# Patient Record
Sex: MALE | Race: WHITE | NOT HISPANIC OR LATINO | URBAN - METROPOLITAN AREA
[De-identification: names, ages, dates, MRNs, and addresses within clinical notes are randomized per-mention and may not be internally consistent; named-entity substitution may affect disease eponyms.]

---

## 2022-10-24 ENCOUNTER — INPATIENT (INPATIENT)
Facility: HOSPITAL | Age: 47
LOS: 2 days | Discharge: HOME | DRG: 189 | End: 2022-10-27
Attending: INTERNAL MEDICINE | Admitting: INTERNAL MEDICINE
Payer: COMMERCIAL

## 2022-10-24 VITALS
HEART RATE: 87 BPM | WEIGHT: 259.93 LBS | HEIGHT: 69 IN | SYSTOLIC BLOOD PRESSURE: 108 MMHG | DIASTOLIC BLOOD PRESSURE: 76 MMHG | OXYGEN SATURATION: 98 % | RESPIRATION RATE: 16 BRPM | TEMPERATURE: 98 F

## 2022-10-24 DIAGNOSIS — R09.02 HYPOXEMIA: ICD-10-CM

## 2022-10-24 DIAGNOSIS — E78.5 HYPERLIPIDEMIA, UNSPECIFIED: ICD-10-CM

## 2022-10-24 DIAGNOSIS — E11.9 TYPE 2 DIABETES MELLITUS WITHOUT COMPLICATIONS: ICD-10-CM

## 2022-10-24 DIAGNOSIS — J96.01 ACUTE RESPIRATORY FAILURE WITH HYPOXIA: ICD-10-CM

## 2022-10-24 DIAGNOSIS — I10 ESSENTIAL (PRIMARY) HYPERTENSION: ICD-10-CM

## 2022-10-24 DIAGNOSIS — R55 SYNCOPE AND COLLAPSE: ICD-10-CM

## 2022-10-24 LAB
ALBUMIN SERPL ELPH-MCNC: 4.4 G/DL — SIGNIFICANT CHANGE UP (ref 3.3–5)
ALP SERPL-CCNC: 81 U/L — SIGNIFICANT CHANGE UP (ref 40–120)
ALT FLD-CCNC: 30 U/L — SIGNIFICANT CHANGE UP (ref 10–45)
ANION GAP SERPL CALC-SCNC: 16 MMOL/L — SIGNIFICANT CHANGE UP (ref 5–17)
APTT BLD: 28.5 SEC — SIGNIFICANT CHANGE UP (ref 27.5–35.5)
AST SERPL-CCNC: 27 U/L — SIGNIFICANT CHANGE UP (ref 10–40)
BASOPHILS # BLD AUTO: 0.02 K/UL — SIGNIFICANT CHANGE UP (ref 0–0.2)
BASOPHILS NFR BLD AUTO: 0.2 % — SIGNIFICANT CHANGE UP (ref 0–2)
BILIRUB SERPL-MCNC: 0.4 MG/DL — SIGNIFICANT CHANGE UP (ref 0.2–1.2)
BUN SERPL-MCNC: 24 MG/DL — HIGH (ref 7–23)
CALCIUM SERPL-MCNC: 10.1 MG/DL — SIGNIFICANT CHANGE UP (ref 8.4–10.5)
CHLORIDE SERPL-SCNC: 101 MMOL/L — SIGNIFICANT CHANGE UP (ref 96–108)
CO2 SERPL-SCNC: 21 MMOL/L — LOW (ref 22–31)
CREAT SERPL-MCNC: 0.79 MG/DL — SIGNIFICANT CHANGE UP (ref 0.5–1.3)
EGFR: 111 ML/MIN/1.73M2 — SIGNIFICANT CHANGE UP
EOSINOPHIL # BLD AUTO: 0.05 K/UL — SIGNIFICANT CHANGE UP (ref 0–0.5)
EOSINOPHIL NFR BLD AUTO: 0.5 % — SIGNIFICANT CHANGE UP (ref 0–6)
FLUAV AG NPH QL: SIGNIFICANT CHANGE UP
FLUBV AG NPH QL: SIGNIFICANT CHANGE UP
GLUCOSE SERPL-MCNC: 101 MG/DL — HIGH (ref 70–99)
HCT VFR BLD CALC: 46.6 % — SIGNIFICANT CHANGE UP (ref 39–50)
HGB BLD-MCNC: 15.3 G/DL — SIGNIFICANT CHANGE UP (ref 13–17)
IMM GRANULOCYTES NFR BLD AUTO: 0.4 % — SIGNIFICANT CHANGE UP (ref 0–0.9)
INR BLD: 1.03 RATIO — SIGNIFICANT CHANGE UP (ref 0.88–1.16)
LYMPHOCYTES # BLD AUTO: 1.48 K/UL — SIGNIFICANT CHANGE UP (ref 1–3.3)
LYMPHOCYTES # BLD AUTO: 16 % — SIGNIFICANT CHANGE UP (ref 13–44)
MAGNESIUM SERPL-MCNC: 1.4 MG/DL — LOW (ref 1.6–2.6)
MCHC RBC-ENTMCNC: 27.8 PG — SIGNIFICANT CHANGE UP (ref 27–34)
MCHC RBC-ENTMCNC: 32.8 GM/DL — SIGNIFICANT CHANGE UP (ref 32–36)
MCV RBC AUTO: 84.6 FL — SIGNIFICANT CHANGE UP (ref 80–100)
MONOCYTES # BLD AUTO: 0.72 K/UL — SIGNIFICANT CHANGE UP (ref 0–0.9)
MONOCYTES NFR BLD AUTO: 7.8 % — SIGNIFICANT CHANGE UP (ref 2–14)
NEUTROPHILS # BLD AUTO: 6.95 K/UL — SIGNIFICANT CHANGE UP (ref 1.8–7.4)
NEUTROPHILS NFR BLD AUTO: 75.1 % — SIGNIFICANT CHANGE UP (ref 43–77)
NRBC # BLD: 0 /100 WBCS — SIGNIFICANT CHANGE UP (ref 0–0)
NT-PROBNP SERPL-SCNC: 45 PG/ML — SIGNIFICANT CHANGE UP (ref 0–300)
PLATELET # BLD AUTO: 275 K/UL — SIGNIFICANT CHANGE UP (ref 150–400)
POTASSIUM SERPL-MCNC: 3.6 MMOL/L — SIGNIFICANT CHANGE UP (ref 3.5–5.3)
POTASSIUM SERPL-SCNC: 3.6 MMOL/L — SIGNIFICANT CHANGE UP (ref 3.5–5.3)
PROT SERPL-MCNC: 8 G/DL — SIGNIFICANT CHANGE UP (ref 6–8.3)
PROTHROM AB SERPL-ACNC: 11.9 SEC — SIGNIFICANT CHANGE UP (ref 10.5–13.4)
RBC # BLD: 5.51 M/UL — SIGNIFICANT CHANGE UP (ref 4.2–5.8)
RBC # FLD: 13.5 % — SIGNIFICANT CHANGE UP (ref 10.3–14.5)
RSV RNA NPH QL NAA+NON-PROBE: SIGNIFICANT CHANGE UP
SARS-COV-2 RNA SPEC QL NAA+PROBE: DETECTED
SODIUM SERPL-SCNC: 138 MMOL/L — SIGNIFICANT CHANGE UP (ref 135–145)
TROPONIN T, HIGH SENSITIVITY RESULT: 19 NG/L — SIGNIFICANT CHANGE UP (ref 0–51)
TROPONIN T, HIGH SENSITIVITY RESULT: 22 NG/L — SIGNIFICANT CHANGE UP (ref 0–51)
WBC # BLD: 9.26 K/UL — SIGNIFICANT CHANGE UP (ref 3.8–10.5)
WBC # FLD AUTO: 9.26 K/UL — SIGNIFICANT CHANGE UP (ref 3.8–10.5)

## 2022-10-24 PROCEDURE — 71045 X-RAY EXAM CHEST 1 VIEW: CPT | Mod: 26

## 2022-10-24 PROCEDURE — 93308 TTE F-UP OR LMTD: CPT | Mod: 26

## 2022-10-24 PROCEDURE — 99254 IP/OBS CNSLTJ NEW/EST MOD 60: CPT | Mod: 25

## 2022-10-24 PROCEDURE — 99285 EMERGENCY DEPT VISIT HI MDM: CPT

## 2022-10-24 PROCEDURE — 93010 ELECTROCARDIOGRAM REPORT: CPT

## 2022-10-24 PROCEDURE — 99221 1ST HOSP IP/OBS SF/LOW 40: CPT

## 2022-10-24 RX ORDER — DEXTROSE 50 % IN WATER 50 %
15 SYRINGE (ML) INTRAVENOUS ONCE
Refills: 0 | Status: DISCONTINUED | OUTPATIENT
Start: 2022-10-24 | End: 2022-10-27

## 2022-10-24 RX ORDER — ACETAMINOPHEN 500 MG
650 TABLET ORAL EVERY 6 HOURS
Refills: 0 | Status: DISCONTINUED | OUTPATIENT
Start: 2022-10-24 | End: 2022-10-27

## 2022-10-24 RX ORDER — ONDANSETRON 8 MG/1
4 TABLET, FILM COATED ORAL EVERY 8 HOURS
Refills: 0 | Status: DISCONTINUED | OUTPATIENT
Start: 2022-10-24 | End: 2022-10-27

## 2022-10-24 RX ORDER — INSULIN LISPRO 100/ML
VIAL (ML) SUBCUTANEOUS
Refills: 0 | Status: DISCONTINUED | OUTPATIENT
Start: 2022-10-24 | End: 2022-10-27

## 2022-10-24 RX ORDER — ATORVASTATIN CALCIUM 80 MG/1
1 TABLET, FILM COATED ORAL
Qty: 0 | Refills: 0 | DISCHARGE

## 2022-10-24 RX ORDER — ATORVASTATIN CALCIUM 80 MG/1
40 TABLET, FILM COATED ORAL AT BEDTIME
Refills: 0 | Status: DISCONTINUED | OUTPATIENT
Start: 2022-10-24 | End: 2022-10-27

## 2022-10-24 RX ORDER — METFORMIN HYDROCHLORIDE 850 MG/1
1 TABLET ORAL
Qty: 0 | Refills: 0 | DISCHARGE

## 2022-10-24 RX ORDER — DEXTROSE 50 % IN WATER 50 %
25 SYRINGE (ML) INTRAVENOUS ONCE
Refills: 0 | Status: DISCONTINUED | OUTPATIENT
Start: 2022-10-24 | End: 2022-10-27

## 2022-10-24 RX ORDER — ALBUTEROL 90 UG/1
2 AEROSOL, METERED ORAL EVERY 6 HOURS
Refills: 0 | Status: DISCONTINUED | OUTPATIENT
Start: 2022-10-24 | End: 2022-10-27

## 2022-10-24 RX ORDER — ASPIRIN/CALCIUM CARB/MAGNESIUM 324 MG
324 TABLET ORAL ONCE
Refills: 0 | Status: COMPLETED | OUTPATIENT
Start: 2022-10-24 | End: 2022-10-24

## 2022-10-24 RX ORDER — ENOXAPARIN SODIUM 100 MG/ML
120 INJECTION SUBCUTANEOUS EVERY 12 HOURS
Refills: 0 | Status: DISCONTINUED | OUTPATIENT
Start: 2022-10-24 | End: 2022-10-24

## 2022-10-24 RX ORDER — GLUCAGON INJECTION, SOLUTION 0.5 MG/.1ML
1 INJECTION, SOLUTION SUBCUTANEOUS ONCE
Refills: 0 | Status: DISCONTINUED | OUTPATIENT
Start: 2022-10-24 | End: 2022-10-27

## 2022-10-24 RX ORDER — LANOLIN ALCOHOL/MO/W.PET/CERES
3 CREAM (GRAM) TOPICAL AT BEDTIME
Refills: 0 | Status: DISCONTINUED | OUTPATIENT
Start: 2022-10-24 | End: 2022-10-27

## 2022-10-24 RX ORDER — DEXAMETHASONE 0.5 MG/5ML
6 ELIXIR ORAL DAILY
Refills: 0 | Status: DISCONTINUED | OUTPATIENT
Start: 2022-10-24 | End: 2022-10-26

## 2022-10-24 RX ORDER — SODIUM CHLORIDE 9 MG/ML
1000 INJECTION, SOLUTION INTRAVENOUS
Refills: 0 | Status: DISCONTINUED | OUTPATIENT
Start: 2022-10-24 | End: 2022-10-27

## 2022-10-24 RX ORDER — MAGNESIUM SULFATE 500 MG/ML
1 VIAL (ML) INJECTION ONCE
Refills: 0 | Status: COMPLETED | OUTPATIENT
Start: 2022-10-24 | End: 2022-10-24

## 2022-10-24 RX ORDER — ESCITALOPRAM OXALATE 10 MG/1
5 TABLET, FILM COATED ORAL DAILY
Refills: 0 | Status: DISCONTINUED | OUTPATIENT
Start: 2022-10-24 | End: 2022-10-27

## 2022-10-24 RX ORDER — DEXTROSE 50 % IN WATER 50 %
12.5 SYRINGE (ML) INTRAVENOUS ONCE
Refills: 0 | Status: DISCONTINUED | OUTPATIENT
Start: 2022-10-24 | End: 2022-10-27

## 2022-10-24 RX ORDER — INSULIN GLARGINE 100 [IU]/ML
10 INJECTION, SOLUTION SUBCUTANEOUS
Qty: 0 | Refills: 0 | DISCHARGE

## 2022-10-24 RX ORDER — ESCITALOPRAM OXALATE 10 MG/1
1 TABLET, FILM COATED ORAL
Qty: 0 | Refills: 0 | DISCHARGE

## 2022-10-24 RX ORDER — INSULIN LISPRO 100/ML
VIAL (ML) SUBCUTANEOUS AT BEDTIME
Refills: 0 | Status: DISCONTINUED | OUTPATIENT
Start: 2022-10-24 | End: 2022-10-27

## 2022-10-24 RX ORDER — ENOXAPARIN SODIUM 100 MG/ML
40 INJECTION SUBCUTANEOUS EVERY 12 HOURS
Refills: 0 | Status: DISCONTINUED | OUTPATIENT
Start: 2022-10-24 | End: 2022-10-27

## 2022-10-24 RX ORDER — LOSARTAN POTASSIUM 100 MG/1
100 TABLET, FILM COATED ORAL DAILY
Refills: 0 | Status: DISCONTINUED | OUTPATIENT
Start: 2022-10-24 | End: 2022-10-27

## 2022-10-24 RX ORDER — HYDROCHLOROTHIAZIDE 25 MG
25 TABLET ORAL DAILY
Refills: 0 | Status: DISCONTINUED | OUTPATIENT
Start: 2022-10-24 | End: 2022-10-25

## 2022-10-24 RX ORDER — DAPAGLIFLOZIN 10 MG/1
1 TABLET, FILM COATED ORAL
Qty: 0 | Refills: 0 | DISCHARGE

## 2022-10-24 RX ADMIN — Medication 100 GRAM(S): at 16:25

## 2022-10-24 NOTE — ED ADULT NURSE NOTE - OBJECTIVE STATEMENT
46y/M BIBEMS from outpatient clinic for abnormal ekg and syncope x 2 days ago. pt endorsed starting new meds recently, hx of DM. A&Ox3, GCS 15. Denies chest pain, sob, dizziness, n/v. No distress

## 2022-10-24 NOTE — ED PROVIDER NOTE - PHYSICAL EXAMINATION
GENERAL: well appearing in no acute distress, non-toxic appearing  HEAD: normocephalic, atraumatic, scabbed over abrasion noted on philtrum  HEENT: normal conjunctiva,   CARDIAC: regular rate and rhythm, no appreciable murmurs, 2+ pulses in UE/LE b/l  PULM: normal breath sounds, clear to ascultation bilaterally, no rales, rhonchi, wheezing  GI: abdomen nondistended, soft, nontender, no guarding, rebound tenderness  NEURO: no focal motor or sensory deficits,  normal speech, PERRLA, EOMI, AAOx3  MSK: no peripheral edema, no calf tenderness b/l  SKIN: well-perfused, extremities warm, no visible rashes  PSYCH: appropriate mood and affect

## 2022-10-24 NOTE — ED PROVIDER NOTE - ATTENDING CONTRIBUTION TO CARE
attending Sachi: 46yM h/o HTN, DM, HLD sent in for abnormal EKG after being evaluated outpatient for multiple presyncopal episodes over the last few days and 1 syncopal episode 2 days ago. Reports dizziness upon walking. Hypoxic on arrival requiring nasal cannula, remainder of exam as above. Will obtain ekg, place on tele, labs including trop and d-dimer, cxr, RVP, reassess

## 2022-10-24 NOTE — H&P ADULT - PROBLEM SELECTOR PLAN 2
Covid +ve, requiring supplemental O2. CXR: poor inspiratory effort with possible hazy opacity in the RLL?, pending final results  -Supplemental O2 to maintain O2 level of 92% or higher   -F/u with inflammatory markers  -Started Dexamethasone 6mg for 10 days   -F/u Blcx   -VTE PPx with Lovenox 40mg BID given Ddimer <150   -ID consulted

## 2022-10-24 NOTE — H&P ADULT - PROBLEM SELECTOR PLAN 1
New onset of syncope with no clear etiology. Mg 1.4, POCUS ECHO negative for pleural effusion, cannot confirm on structural heart disease. ECG: T-wave inversion, new findings compared to previous ECG of 10/11/22. +covid infection. D-dimer negative. Trops negative x2    -Tele monitoring   -Maintain electrolytes, f/u with daily BMP   -Treat underlying cause of acute respiratory failure due to Covid   -Maintain BG of 140-180mg/dl during hospitalization   -Maintain MAP of 65   -Patient can benefit from ECHO TTE, will f/u with Cardiology recs  -Cardiology consulted

## 2022-10-24 NOTE — ED PROCEDURE NOTE - US CPT CODES
59775 US Chest (PTX, Pleural Effussion/CHF vs COPD)/15225 Echocardiography Transthoracic with Image 2D (Echo/FAST)

## 2022-10-24 NOTE — H&P ADULT - ASSESSMENT
47 y/o M with PMhx of HTN, DM2, HLD admitted for acute respiratory failure with hypoxia with syncopal work-up, likely due to COVID infection, rule out ACS given comorbidities.

## 2022-10-24 NOTE — H&P ADULT - HISTORY OF PRESENT ILLNESS
45 y/o M with PMhx of HTN, DM2, HLD presents with syncope, which started 3 days ago. Patient states he had one episode of syncope after walking on a flat incline for 30 mins, associated with dyspnea, which is unusual for him. Shortly afterwards patient was in his living room with dizziness and had syncope as well. Later that afternoon he was walking across his bedroom, felt dizzy, passed out, and woke up with an abrasion on his upper lip. On Sunday pt had another episode of feeling dizzy, his friend who is a ED doc checked orthostatics which were 110/70 supine and 100/70 standing. Denies chest pain, chest palpitations, headache, vision changes, abd pain, n/v/d/f, vertigo, vision changes, loss of sensation, weakness, urinary or bowel changes, focal neurological deficits, head trauma. Patient states the only change was his medication Farixga which was changed from 5mg to 10mg  3 weeks ago. States his blood glucose was around 100. Patient states he was diagnosed with Covid last week of September, with no hospitalizations. States he follows up with outpatient Cardiologist. Non-smoker. Works at [employer], lives with his mother. Patient followed up with his PCP this am, with new ECG changes for t-wave inversions compared to prior ECG on 10/11/22.

## 2022-10-24 NOTE — ED PROVIDER NOTE - CLINICAL SUMMARY MEDICAL DECISION MAKING FREE TEXT BOX
46 year old male hx of dm, htn, hld, presenting for evaluation of 1 episode of syncope and multiple episodes of pre-syncope, with one episode of POOLE after a 30 minute walk on flat terrain since Saturday, sent in by PMD for changes on his EKG, afebrile, saturating well on triage vitals, no evidence of fluid overload on exam.     Concern for ACS vs PE vs Arrythmia, will do cardiac workup, get ED US echo.   Pt PERCs out, however, given pt's multiple episodes of near-syncope and syncope will get a d-dimer, if elevated will follow with CTA. Pt likely requires admission to either hospital or CDU for further eval.

## 2022-10-24 NOTE — CONSULT NOTE ADULT - SUBJECTIVE AND OBJECTIVE BOX
CHIEF COMPLAINT:  syncope recent covid, covid swab postive for sarishacovid 19 abnormal ecg      HISTORY OF PRESENT ILLNESS:    Patient has hx of hypertension diabetes hypelipidemia  hx of elevated BMI with significant weight loss over last several months  normal ecg and normal nuclear stress test ?May 2022    Had covid 1 month ago sore throat fatigue  had episode of near syncope and episode of true syncope several days ago. Had been feeling sob with walking hills which is new  no palpitations or chest pain    ecg Dr. Gilbert T inversions inferorly  pocus echo in er normal wall motion    presently denies sob or cp but O2 sat at 90-92%        PAST MEDICAL & SURGICAL HISTORY:    HTN (hypertension)      HLD (hyperlipidemia)      DM (diabetes mellitus)      No significant past surgical history              MEDICATIONS:  enoxaparin Injectable 120 milliGRAM(s) SubCutaneous every 12 hours  hydrochlorothiazide 25 milliGRAM(s) Oral daily  losartan 100 milliGRAM(s) Oral daily      ALBUTerol    90 MICROgram(s) HFA Inhaler 2 Puff(s) Inhalation every 6 hours PRN    acetaminophen     Tablet .. 650 milliGRAM(s) Oral every 6 hours PRN  escitalopram 5 milliGRAM(s) Oral daily  melatonin 3 milliGRAM(s) Oral at bedtime PRN  ondansetron Injectable 4 milliGRAM(s) IV Push every 8 hours PRN    aluminum hydroxide/magnesium hydroxide/simethicone Suspension 30 milliLiter(s) Oral every 4 hours PRN    atorvastatin 40 milliGRAM(s) Oral at bedtime  dexAMETHasone     Tablet 6 milliGRAM(s) Oral daily  dextrose 50% Injectable 25 Gram(s) IV Push once  dextrose 50% Injectable 12.5 Gram(s) IV Push once  dextrose 50% Injectable 25 Gram(s) IV Push once  dextrose Oral Gel 15 Gram(s) Oral once PRN  glucagon  Injectable 1 milliGRAM(s) IntraMuscular once  insulin lispro (ADMELOG) corrective regimen sliding scale   SubCutaneous three times a day before meals    dextrose 5%. 1000 milliLiter(s) IV Continuous <Continuous>  dextrose 5%. 1000 milliLiter(s) IV Continuous <Continuous>      FAMILY HISTORY:      SOCIAL HISTORY:    [ ] Non-smoker  [ ] Alcohol none    Allergies    No Known Allergies    Intolerances    	    PHYSICAL EXAM:  T(C): 36.8 (10-24-22 @ 16:54), Max: 36.8 (10-24-22 @ 16:54)  HR: 96 (10-24-22 @ 16:54) (84 - 96)  BP: 117/74 (10-24-22 @ 16:54) (108/76 - 121/71)  RR: 26 (10-24-22 @ 16:54) (16 - 26)  SpO2: 96% (10-24-22 @ 16:54) (88% - 98%)  Wt(kg): --  I&O's Summary      Appearance: Normal	nad lying flat  HEENT:   Normal oral mucosa, PERRL, EOMI	  Cardiovascular: Normal S1 S2, No JVD, No murmurs no s3  Respiratory: Lungs clear to auscultation	  Psychiatry: A & O x 3, Mood & affect appropriate  Gastrointestinal:  Soft, Non-tender, + BS protuberant abdomen	  Skin: No rashes, No ecchymoses, No cyanosis	  Neurologic: Non-focal  Extremities: No clubbing, cyanosis or edema  Vascular: Peripheral pulses palpable 2+ bilaterally    TELEMETRY: 	    ECG:  NSR T inversions inferiorly	  RADIOLOGY:    LABS:	 	    CARDIAC MARKERS:                                  15.3   9.26  )-----------( 275      ( 24 Oct 2022 14:34 )             46.6     10-24    138  |  101  |  24<H>  ----------------------------<  101<H>  3.6   |  21<L>  |  0.79    Ca    10.1      24 Oct 2022 14:34  Mg     1.4     10-24    TPro  8.0  /  Alb  4.4  /  TBili  0.4  /  DBili  x   /  AST  27  /  ALT  30  /  AlkPhos  81  10-24    proBNP: Serum Pro-Brain Natriuretic Peptide: 45 pg/mL (10-24 @ 14:34)    Lipid Profile:   HgA1c:   TSH:

## 2022-10-24 NOTE — ED PROVIDER NOTE - SHIFT CHANGE DETAILS
Attending MD Navarrete: 46M w obese, HTN, multiple near syncopal episodes, seen by Dr. Tovar's partner, new TWI inferioly, here hypoxic to 80s, found to have +COVID, pending only admission, Dr. Ladd paged

## 2022-10-24 NOTE — CONSULT NOTE ADULT - ASSESSMENT
Patient presents with one episode of near syncope and one episode of true syncope perhaps related to dehyration now with some lower O2 saturation, abnormal ecg and psotive for covid with normal enzymes and bnp and pocus echo    1. abnormal ecg- doubt ischemia r/o MI formal echo  2. sob covid postive perhaps related to covid  3. hypertension controlled  4. diabetes on  multiple medications at home including long active injectables    Recommend  2 D Echo in echo lab  send D-Dimer  CT chest with contrast?    will follow for further cardia advice

## 2022-10-24 NOTE — H&P ADULT - PROBLEM SELECTOR PLAN 3
-F/u with Hba1c, lipid panel   -Hold home med: Semglee, Metformin and Farixga  -LDISS and fingersticks QHS and AC   -Diet: diabetic

## 2022-10-24 NOTE — ED PROVIDER NOTE - PROGRESS NOTE DETAILS
Sita Johnson, PGY1, MD: during physical exam pt had an episode of flushing and hypoxia to the 90%, episode resolved after no intervention, pt denies sob or cp. 20 minutes later pt had a second decline in o2 saturation to the high 80, pt now placed on 2L NC saturating 95%, still denies cp or sob Sita Johnson, PGY1, MD: pt showed us outpatient EKG taken on oct 11, TWI in inferior leads on EKG are new compared to the prior ekg Attending MD Navarrete: Spoke with Giuliana at Dr. Tovar Attending MD Navarrete: Spoke with Giuliana at Dr. Tovar, reports speaking with Dr. Ladd in regards to patient.  Can admit to his service.  Received call from Dr. Ladd, discussed case, will admit to his service.  Patient updated. Sita Johnson, PGY1, MD: pt states he was positive for covid 3 weeks ago, tested negative for [place] at home

## 2022-10-24 NOTE — H&P ADULT - NEGATIVE NEUROLOGICAL SYMPTOMS
no transient paralysis/no weakness/no paresthesias/no generalized seizures/no vertigo/no loss of sensation/no difficulty walking/no headache/no loss of consciousness/no confusion

## 2022-10-24 NOTE — PATIENT PROFILE ADULT - FALL HARM RISK - HARM RISK INTERVENTIONS
Assistance with ambulation/Assistance OOB with selected safe patient handling equipment/Communicate Risk of Fall with Harm to all staff/Monitor gait and stability/Reinforce activity limits and safety measures with patient and family/Sit up slowly, dangle for a short time, stand at bedside before walking/Tailored Fall Risk Interventions/Visual Cue: Yellow wristband and red socks/Bed in lowest position, wheels locked, appropriate side rails in place/Call bell, personal items and telephone in reach/Instruct patient to call for assistance before getting out of bed or chair/Non-slip footwear when patient is out of bed/[place] to call system/Physically safe environment - no spills, clutter or unnecessary equipment/Purposeful Proactive Rounding/Room/bathroom lighting operational, light cord in reach

## 2022-10-24 NOTE — ED PROVIDER NOTE - OBJECTIVE STATEMENT
46 year old male hx of htn, dm, hld, sent in by PMD for evaluation of EKG changes noticed in office this am after pt went in for evaluation of multiple presyncopal episodes and 1 syncopal episode since Saturday. Pt reports that Saturday morning he went for his usual 30 minute walk along a flat incline and at the end of the walk noticed some SOB which has never happened before. Afterwards the pt was walking across his living room when he became very dizzy and collapsed into a chair. Later that afternoon he was walking across his bedroom, felt dizzy, passed out, and woke up with an abrasion on his upper lip. On sunday pt had another episode of feeling dizzy, his friend who is a ED doc checked orthostatics which were 110/70 supine and 100/70 standing. Pt denies any cp, swelling in the le, pain with breathing, SOB, sweating, fevers, n/v.. Pt was evaluated by his pcp this am who noted ekg changes and sent him in for eval.  Pt was lightheaded this am.

## 2022-10-25 DIAGNOSIS — U07.1 COVID-19: ICD-10-CM

## 2022-10-25 DIAGNOSIS — R09.02 HYPOXEMIA: ICD-10-CM

## 2022-10-25 LAB
A1C WITH ESTIMATED AVERAGE GLUCOSE RESULT: 6.2 % — HIGH (ref 4–5.6)
ANION GAP SERPL CALC-SCNC: 16 MMOL/L — SIGNIFICANT CHANGE UP (ref 5–17)
BASOPHILS # BLD AUTO: 0.02 K/UL — SIGNIFICANT CHANGE UP (ref 0–0.2)
BASOPHILS NFR BLD AUTO: 0.3 % — SIGNIFICANT CHANGE UP (ref 0–2)
BUN SERPL-MCNC: 18 MG/DL — SIGNIFICANT CHANGE UP (ref 7–23)
CALCIUM SERPL-MCNC: 10.1 MG/DL — SIGNIFICANT CHANGE UP (ref 8.4–10.5)
CHLORIDE SERPL-SCNC: 100 MMOL/L — SIGNIFICANT CHANGE UP (ref 96–108)
CHOLEST SERPL-MCNC: 101 MG/DL — SIGNIFICANT CHANGE UP
CO2 SERPL-SCNC: 24 MMOL/L — SIGNIFICANT CHANGE UP (ref 22–31)
CREAT SERPL-MCNC: 0.84 MG/DL — SIGNIFICANT CHANGE UP (ref 0.5–1.3)
CRP SERPL-MCNC: 22 MG/L — HIGH (ref 0–4)
EGFR: 109 ML/MIN/1.73M2 — SIGNIFICANT CHANGE UP
EOSINOPHIL # BLD AUTO: 0.09 K/UL — SIGNIFICANT CHANGE UP (ref 0–0.5)
EOSINOPHIL NFR BLD AUTO: 1.2 % — SIGNIFICANT CHANGE UP (ref 0–6)
ERYTHROCYTE [SEDIMENTATION RATE] IN BLOOD: 66 MM/HR — HIGH (ref 0–15)
ESTIMATED AVERAGE GLUCOSE: 131 MG/DL — HIGH (ref 68–114)
GLUCOSE BLDC GLUCOMTR-MCNC: 106 MG/DL — HIGH (ref 70–99)
GLUCOSE BLDC GLUCOMTR-MCNC: 107 MG/DL — HIGH (ref 70–99)
GLUCOSE BLDC GLUCOMTR-MCNC: 148 MG/DL — HIGH (ref 70–99)
GLUCOSE BLDC GLUCOMTR-MCNC: 160 MG/DL — HIGH (ref 70–99)
GLUCOSE BLDC GLUCOMTR-MCNC: 175 MG/DL — HIGH (ref 70–99)
GLUCOSE BLDC GLUCOMTR-MCNC: 94 MG/DL — SIGNIFICANT CHANGE UP (ref 70–99)
GLUCOSE SERPL-MCNC: 100 MG/DL — HIGH (ref 70–99)
HCT VFR BLD CALC: 48.7 % — SIGNIFICANT CHANGE UP (ref 39–50)
HDLC SERPL-MCNC: 45 MG/DL — SIGNIFICANT CHANGE UP
HGB BLD-MCNC: 15.7 G/DL — SIGNIFICANT CHANGE UP (ref 13–17)
IMM GRANULOCYTES NFR BLD AUTO: 0.4 % — SIGNIFICANT CHANGE UP (ref 0–0.9)
LDH SERPL L TO P-CCNC: 157 U/L — SIGNIFICANT CHANGE UP (ref 50–242)
LIPID PNL WITH DIRECT LDL SERPL: 42 MG/DL — SIGNIFICANT CHANGE UP
LYMPHOCYTES # BLD AUTO: 1.65 K/UL — SIGNIFICANT CHANGE UP (ref 1–3.3)
LYMPHOCYTES # BLD AUTO: 21.1 % — SIGNIFICANT CHANGE UP (ref 13–44)
MAGNESIUM SERPL-MCNC: 1.5 MG/DL — LOW (ref 1.6–2.6)
MCHC RBC-ENTMCNC: 27.5 PG — SIGNIFICANT CHANGE UP (ref 27–34)
MCHC RBC-ENTMCNC: 32.2 GM/DL — SIGNIFICANT CHANGE UP (ref 32–36)
MCV RBC AUTO: 85.3 FL — SIGNIFICANT CHANGE UP (ref 80–100)
MONOCYTES # BLD AUTO: 0.61 K/UL — SIGNIFICANT CHANGE UP (ref 0–0.9)
MONOCYTES NFR BLD AUTO: 7.8 % — SIGNIFICANT CHANGE UP (ref 2–14)
NEUTROPHILS # BLD AUTO: 5.41 K/UL — SIGNIFICANT CHANGE UP (ref 1.8–7.4)
NEUTROPHILS NFR BLD AUTO: 69.2 % — SIGNIFICANT CHANGE UP (ref 43–77)
NON HDL CHOLESTEROL: 56 MG/DL — SIGNIFICANT CHANGE UP
NRBC # BLD: 0 /100 WBCS — SIGNIFICANT CHANGE UP (ref 0–0)
PHOSPHATE SERPL-MCNC: 4.5 MG/DL — SIGNIFICANT CHANGE UP (ref 2.5–4.5)
PLATELET # BLD AUTO: 270 K/UL — SIGNIFICANT CHANGE UP (ref 150–400)
POTASSIUM SERPL-MCNC: 3.6 MMOL/L — SIGNIFICANT CHANGE UP (ref 3.5–5.3)
POTASSIUM SERPL-SCNC: 3.6 MMOL/L — SIGNIFICANT CHANGE UP (ref 3.5–5.3)
PROCALCITONIN SERPL-MCNC: 0.04 NG/ML — SIGNIFICANT CHANGE UP (ref 0.02–0.1)
RBC # BLD: 5.71 M/UL — SIGNIFICANT CHANGE UP (ref 4.2–5.8)
RBC # FLD: 13.3 % — SIGNIFICANT CHANGE UP (ref 10.3–14.5)
SODIUM SERPL-SCNC: 140 MMOL/L — SIGNIFICANT CHANGE UP (ref 135–145)
T4 FREE+ TSH PNL SERPL: 1.97 UIU/ML — SIGNIFICANT CHANGE UP (ref 0.27–4.2)
TRIGL SERPL-MCNC: 69 MG/DL — SIGNIFICANT CHANGE UP
WBC # BLD: 7.81 K/UL — SIGNIFICANT CHANGE UP (ref 3.8–10.5)
WBC # FLD AUTO: 7.81 K/UL — SIGNIFICANT CHANGE UP (ref 3.8–10.5)

## 2022-10-25 PROCEDURE — 71275 CT ANGIOGRAPHY CHEST: CPT | Mod: 26

## 2022-10-25 PROCEDURE — 99254 IP/OBS CNSLTJ NEW/EST MOD 60: CPT

## 2022-10-25 RX ORDER — CHLORHEXIDINE GLUCONATE 213 G/1000ML
1 SOLUTION TOPICAL
Refills: 0 | Status: DISCONTINUED | OUTPATIENT
Start: 2022-10-25 | End: 2022-10-27

## 2022-10-25 RX ORDER — MAGNESIUM SULFATE 500 MG/ML
2 VIAL (ML) INJECTION ONCE
Refills: 0 | Status: COMPLETED | OUTPATIENT
Start: 2022-10-25 | End: 2022-10-25

## 2022-10-25 RX ADMIN — ENOXAPARIN SODIUM 40 MILLIGRAM(S): 100 INJECTION SUBCUTANEOUS at 17:07

## 2022-10-25 RX ADMIN — ATORVASTATIN CALCIUM 40 MILLIGRAM(S): 80 TABLET, FILM COATED ORAL at 22:01

## 2022-10-25 RX ADMIN — LOSARTAN POTASSIUM 100 MILLIGRAM(S): 100 TABLET, FILM COATED ORAL at 06:47

## 2022-10-25 RX ADMIN — ENOXAPARIN SODIUM 40 MILLIGRAM(S): 100 INJECTION SUBCUTANEOUS at 06:46

## 2022-10-25 RX ADMIN — Medication 1: at 17:07

## 2022-10-25 RX ADMIN — Medication 25 MILLIGRAM(S): at 06:47

## 2022-10-25 RX ADMIN — Medication 3 MILLIGRAM(S): at 22:05

## 2022-10-25 RX ADMIN — Medication 25 GRAM(S): at 08:42

## 2022-10-25 RX ADMIN — CHLORHEXIDINE GLUCONATE 1 APPLICATION(S): 213 SOLUTION TOPICAL at 12:15

## 2022-10-25 RX ADMIN — ESCITALOPRAM OXALATE 5 MILLIGRAM(S): 10 TABLET, FILM COATED ORAL at 12:15

## 2022-10-25 RX ADMIN — Medication 6 MILLIGRAM(S): at 08:36

## 2022-10-25 NOTE — PHYSICAL THERAPY INITIAL EVALUATION ADULT - PERTINENT HX OF CURRENT PROBLEM, REHAB EVAL
47 y/o M with PMhx of HTN, DM2, HLD admitted for acute respiratory failure with hypoxia with syncopal work-up, likely due to COVID infection, rule out ACS given comorbidities. Hosp course: POCUS TTE 10/24 No Pericardial Effusion.

## 2022-10-25 NOTE — PHYSICAL THERAPY INITIAL EVALUATION ADULT - ADDITIONAL COMMENTS
Pt lives in an apartment alone with elevator access. Pt performed ADL/IADLs independently. Ambulates with no assistive device

## 2022-10-25 NOTE — CONSULT NOTE ADULT - PROBLEM SELECTOR RECOMMENDATION 9
-Mild hypoxia in ED  -CXR grossly clear  -Although ddimer normal, CTA chest ordered to r/o PE given syncope and to further evaluate lung parenchyma  -Keep sats >90% with O2 PRN  -No SOB at rest, +POOLE -Mild hypoxia in ED  -CXR grossly clear  -Although ddimer normal, CTA chest ordered to r/o PE given syncope and to further evaluate lung parenchyma  -Keep sats >90% with O2 PRN  -No SOB at rest, +POOLE  -VBG in AM

## 2022-10-25 NOTE — CONSULT NOTE ADULT - SUBJECTIVE AND OBJECTIVE BOX
PULMONARY CONSULT    HPI: 47 y/o M with PMH of HTN, DM2, HLD. Presents with syncope, which started 3 days prior to admission. Patient states he had one episode of syncope after walking on a flat incline for 30 mins, associated with dyspnea, which is unusual for him. Shortly afterwards patient was in his living room with dizziness and had syncope as well. Later that afternoon he was walking across his bedroom, felt dizzy, passed out, and woke up with an abrasion on his upper lip. On Sunday pt had another episode of feeling dizzy, his friend who is a ED doc checked orthostatics which were 110/70 supine and 100/70 standing. He was diagnosed with COVID last week of September, with no hospitalizations. States he follows up with outpatient Cardiologist. Patient followed up with his PCP this am, with new ECG changes for t-wave inversions compared to prior ECG on 10/11/22. Found to have mild hypoxia in ED - 88% RA, placed on 2LNC. Denies SOB at rest, sputum production, CP, pleuritic CP.         PAST MEDICAL & SURGICAL HISTORY:  HTN (hypertension)  HLD (hyperlipidemia)  DM (diabetes mellitus)  No significant past surgical history      Allergies  No Known Allergies      FAMILY HISTORY:  Family history of early CAD (Father)      Social history: never a smoker     Review of Systems:  CONSTITUTIONAL: No fever, chills, or fatigue  EYES: No eye pain, visual disturbances, or discharge  ENMT:  No difficulty hearing, tinnitus, vertigo; No sinus or throat pain  NECK: No pain or stiffness  RESPIRATORY: Per above  CARDIOVASCULAR: No chest pain, palpitations, dizziness, or leg swelling  GASTROINTESTINAL: No abdominal or epigastric pain. No nausea, vomiting, or hematemesis; No diarrhea or constipation. No melena or hematochezia.  GENITOURINARY: No dysuria, frequency, hematuria, or incontinence  NEUROLOGICAL: Per above   SKIN: No itching, burning, rashes, or lesions   MUSCULOSKELETAL: No joint pain or swelling; No muscle, back, or extremity pain  PSYCHIATRIC: No depression, anxiety, mood swings, or difficulty sleeping      Medications:  MEDICATIONS  (STANDING):  atorvastatin 40 milliGRAM(s) Oral at bedtime  chlorhexidine 2% Cloths 1 Application(s) Topical <User Schedule>  dexAMETHasone     Tablet 6 milliGRAM(s) Oral daily  dextrose 5%. 1000 milliLiter(s) (50 mL/Hr) IV Continuous <Continuous>  dextrose 5%. 1000 milliLiter(s) (100 mL/Hr) IV Continuous <Continuous>  dextrose 50% Injectable 25 Gram(s) IV Push once  dextrose 50% Injectable 12.5 Gram(s) IV Push once  dextrose 50% Injectable 25 Gram(s) IV Push once  enoxaparin Injectable 40 milliGRAM(s) SubCutaneous every 12 hours  escitalopram 5 milliGRAM(s) Oral daily  glucagon  Injectable 1 milliGRAM(s) IntraMuscular once  hydrochlorothiazide 25 milliGRAM(s) Oral daily  insulin lispro (ADMELOG) corrective regimen sliding scale   SubCutaneous three times a day before meals  insulin lispro (ADMELOG) corrective regimen sliding scale   SubCutaneous at bedtime  losartan 100 milliGRAM(s) Oral daily    MEDICATIONS  (PRN):  acetaminophen     Tablet .. 650 milliGRAM(s) Oral every 6 hours PRN Temp greater or equal to 38C (100.4F), Mild Pain (1 - 3)  ALBUTerol    90 MICROgram(s) HFA Inhaler 2 Puff(s) Inhalation every 6 hours PRN Shortness of Breath and/or Wheezing  aluminum hydroxide/magnesium hydroxide/simethicone Suspension 30 milliLiter(s) Oral every 4 hours PRN Dyspepsia  dextrose Oral Gel 15 Gram(s) Oral once PRN Blood Glucose LESS THAN 70 milliGRAM(s)/deciliter  melatonin 3 milliGRAM(s) Oral at bedtime PRN Insomnia  ondansetron Injectable 4 milliGRAM(s) IV Push every 8 hours PRN Nausea and/or Vomiting            Vital Signs Last 24 Hrs  T(C): 36.7 (25 Oct 2022 11:48), Max: 37.1 (24 Oct 2022 19:24)  T(F): 98.1 (25 Oct 2022 11:48), Max: 98.8 (24 Oct 2022 19:24)  HR: 70 (25 Oct 2022 04:00) (70 - 96)  BP: 123/74 (25 Oct 2022 04:00) (108/76 - 123/74)  BP(mean): 88 (24 Oct 2022 18:21) (82 - 88)  RR: 18 (25 Oct 2022 11:48) (16 - 26)  SpO2: 97% (25 Oct 2022 11:48) (88% - 99%)    Parameters below as of 25 Oct 2022 11:48  Patient On (Oxygen Delivery Method): nasal cannula  O2 Flow (L/min): 2                    LABS:                        15.7   7.81  )-----------( 270      ( 25 Oct 2022 05:47 )             48.7     10-25    140  |  100  |  18  ----------------------------<  100<H>  3.6   |  24  |  0.84    Ca    10.1      25 Oct 2022 05:45  Phos  4.5     10-25  Mg     1.5     10-25    TPro  8.0  /  Alb  4.4  /  TBili  0.4  /  DBili  x   /  AST  27  /  ALT  30  /  AlkPhos  81  10-24          CAPILLARY BLOOD GLUCOSE      POCT Blood Glucose.: 148 mg/dL (25 Oct 2022 11:44)    PT/INR - ( 24 Oct 2022 14:34 )   PT: 11.9 sec;   INR: 1.03 ratio         PTT - ( 24 Oct 2022 14:34 )  PTT:28.5 sec    Procalcitonin, Serum: 0.04 ng/mL (10-25-22 @ 05:45)    Serum Pro-Brain Natriuretic Peptide: 45 pg/mL (10-24-22 @ 14:34)          Physical Examination:    General: No acute distress.      HEENT: Pupils equal, reactive to light.  Symmetric.    PULM: Clear to auscultation bilaterally, no significant sputum production    CVS: S1, S2    ABD: Soft, nondistended, nontender, normoactive bowel sounds, no masses    EXT: No edema, nontender    SKIN: Warm and well perfused, no rashes noted.    NEURO: Alert, oriented, interactive, nonfocal      RADIOLOGY REVIEWED  CXR: grossly clear

## 2022-10-25 NOTE — PROGRESS NOTE ADULT - SUBJECTIVE AND OBJECTIVE BOX
DATE OF SERVICE: 10-25-22 @ 11:44  CHIEF COMPLAINT:Patient is a 46y old  Male who presents with a chief complaint of abnormal ecg new T inversion inferiorly (24 Oct 2022 18:29)    	        PAST MEDICAL & SURGICAL HISTORY:  HTN (hypertension)      HLD (hyperlipidemia)      DM (diabetes mellitus)      No significant past surgical history              REVIEW OF SYSTEMS:  CONSTITUTIONAL: No fever, weight loss, or fatigue  EYES: No eye pain, visual disturbances, or discharge  NECK: No pain or stiffness  RESPIRATORY: No cough, wheezing, chills or hemoptysis; No Shortness of Breath  CARDIOVASCULAR: No chest pain, palpitations, passing out, dizziness, or leg swelling  GASTROINTESTINAL: No abdominal or epigastric pain. No nausea, vomiting, or hematemesis; No diarrhea or constipation. No melena or hematochezia.  GENITOURINARY: No dysuria, frequency, hematuria, or incontinence  NEUROLOGICAL: No headaches, memory loss, loss of strength, numbness, or tremors  SKIN: No itching, burning, rashes, or lesions   LYMPH Nodes: No enlarged glands  ENDOCRINE: No heat or cold intolerance; No hair loss  MUSCULOSKELETAL: No joint pain or swelling; No muscle, back, or extremity pain    Medications:  MEDICATIONS  (STANDING):  atorvastatin 40 milliGRAM(s) Oral at bedtime  dexAMETHasone     Tablet 6 milliGRAM(s) Oral daily  dextrose 5%. 1000 milliLiter(s) (50 mL/Hr) IV Continuous <Continuous>  dextrose 5%. 1000 milliLiter(s) (100 mL/Hr) IV Continuous <Continuous>  dextrose 50% Injectable 25 Gram(s) IV Push once  dextrose 50% Injectable 12.5 Gram(s) IV Push once  dextrose 50% Injectable 25 Gram(s) IV Push once  enoxaparin Injectable 40 milliGRAM(s) SubCutaneous every 12 hours  escitalopram 5 milliGRAM(s) Oral daily  glucagon  Injectable 1 milliGRAM(s) IntraMuscular once  hydrochlorothiazide 25 milliGRAM(s) Oral daily  insulin lispro (ADMELOG) corrective regimen sliding scale   SubCutaneous three times a day before meals  insulin lispro (ADMELOG) corrective regimen sliding scale   SubCutaneous at bedtime  losartan 100 milliGRAM(s) Oral daily    MEDICATIONS  (PRN):  acetaminophen     Tablet .. 650 milliGRAM(s) Oral every 6 hours PRN Temp greater or equal to 38C (100.4F), Mild Pain (1 - 3)  ALBUTerol    90 MICROgram(s) HFA Inhaler 2 Puff(s) Inhalation every 6 hours PRN Shortness of Breath and/or Wheezing  aluminum hydroxide/magnesium hydroxide/simethicone Suspension 30 milliLiter(s) Oral every 4 hours PRN Dyspepsia  dextrose Oral Gel 15 Gram(s) Oral once PRN Blood Glucose LESS THAN 70 milliGRAM(s)/deciliter  melatonin 3 milliGRAM(s) Oral at bedtime PRN Insomnia  ondansetron Injectable 4 milliGRAM(s) IV Push every 8 hours PRN Nausea and/or Vomiting    	    PHYSICAL EXAM:  T(C): 36.7 (10-25-22 @ 04:00), Max: 37.1 (10-24-22 @ 19:24)  HR: 70 (10-25-22 @ 04:00) (70 - 96)  BP: 123/74 (10-25-22 @ 04:00) (108/76 - 123/74)  RR: 18 (10-25-22 @ 04:00) (16 - 26)  SpO2: 99% (10-25-22 @ 04:00) (88% - 99%)  Wt(kg): --  I&O's Summary    25 Oct 2022 07:01  -  25 Oct 2022 11:44  --------------------------------------------------------  IN: 200 mL / OUT: 0 mL / NET: 200 mL        Appearance: Normal	  HEENT:   Normal oral mucosa, PERRL, EOMI	  Lymphatic: No lymphadenopathy  Cardiovascular: Normal S1 S2, No JVD, No murmurs, No edema  Respiratory: Lungs clear to auscultation	  Psychiatry: A & O x 3, Mood & affect appropriate  Gastrointestinal:  Soft, Non-tender, + BS	  Skin: No rashes, No ecchymoses, No cyanosis	  Neurologic: Non-focal  Extremities: Normal range of motion, No clubbing, cyanosis or edema  Vascular: Peripheral pulses palpable 2+ bilaterally    TELEMETRY: 	    ECG:  	  RADIOLOGY:  OTHER: 	  	  LABS:	 	    CARDIAC MARKERS:                                15.7   7.81  )-----------( 270      ( 25 Oct 2022 05:47 )             48.7     10-25    140  |  100  |  18  ----------------------------<  100<H>  3.6   |  24  |  0.84    Ca    10.1      25 Oct 2022 05:45  Phos  4.5     10-25  Mg     1.5     10-25    TPro  8.0  /  Alb  4.4  /  TBili  0.4  /  DBili  x   /  AST  27  /  ALT  30  /  AlkPhos  81  10-24    proBNP: Serum Pro-Brain Natriuretic Peptide: 45 pg/mL (10-24 @ 14:34)    Lipid Profile:   HgA1c:   TSH:     	         DATE OF SERVICE: 10-25-22 @ 11:44  CHIEF COMPLAINT:Patient is a 46y old  Male who presents with a chief complaint of abnormal ecg new T inversion inferiorly (24 Oct 2022 18:29)    	        PAST MEDICAL & SURGICAL HISTORY:  HTN (hypertension)      HLD (hyperlipidemia)      DM (diabetes mellitus)      No significant past surgical history              REVIEW OF SYSTEMS:  CONSTITUTIONAL: No fever, weight loss, or fatigue  EYES: No eye pain, visual disturbances, or discharge  NECK: No pain or stiffness  RESPIRATORY: sob/ cough  CARDIOVASCULAR: No chest pain, palpitations, passing out, dizziness, or leg swelling  GASTROINTESTINAL: No abdominal or epigastric pain. No nausea, vomiting, or hematemesis; No diarrhea or constipation. No melena or hematochezia.  GENITOURINARY: No dysuria, frequency, hematuria, or incontinence  NEUROLOGICAL: No headaches, memory loss, loss of strength, numbness, or tremors    MUSCULOSKELETAL: No joint pain or swelling; No muscle, back, or extremity pain    Medications:  MEDICATIONS  (STANDING):  atorvastatin 40 milliGRAM(s) Oral at bedtime  dexAMETHasone     Tablet 6 milliGRAM(s) Oral daily  dextrose 5%. 1000 milliLiter(s) (50 mL/Hr) IV Continuous <Continuous>  dextrose 5%. 1000 milliLiter(s) (100 mL/Hr) IV Continuous <Continuous>  dextrose 50% Injectable 25 Gram(s) IV Push once  dextrose 50% Injectable 12.5 Gram(s) IV Push once  dextrose 50% Injectable 25 Gram(s) IV Push once  enoxaparin Injectable 40 milliGRAM(s) SubCutaneous every 12 hours  escitalopram 5 milliGRAM(s) Oral daily  glucagon  Injectable 1 milliGRAM(s) IntraMuscular once  hydrochlorothiazide 25 milliGRAM(s) Oral daily  insulin lispro (ADMELOG) corrective regimen sliding scale   SubCutaneous three times a day before meals  insulin lispro (ADMELOG) corrective regimen sliding scale   SubCutaneous at bedtime  losartan 100 milliGRAM(s) Oral daily    MEDICATIONS  (PRN):  acetaminophen     Tablet .. 650 milliGRAM(s) Oral every 6 hours PRN Temp greater or equal to 38C (100.4F), Mild Pain (1 - 3)  ALBUTerol    90 MICROgram(s) HFA Inhaler 2 Puff(s) Inhalation every 6 hours PRN Shortness of Breath and/or Wheezing  aluminum hydroxide/magnesium hydroxide/simethicone Suspension 30 milliLiter(s) Oral every 4 hours PRN Dyspepsia  dextrose Oral Gel 15 Gram(s) Oral once PRN Blood Glucose LESS THAN 70 milliGRAM(s)/deciliter  melatonin 3 milliGRAM(s) Oral at bedtime PRN Insomnia  ondansetron Injectable 4 milliGRAM(s) IV Push every 8 hours PRN Nausea and/or Vomiting    	    PHYSICAL EXAM:  T(C): 36.7 (10-25-22 @ 04:00), Max: 37.1 (10-24-22 @ 19:24)  HR: 70 (10-25-22 @ 04:00) (70 - 96)  BP: 123/74 (10-25-22 @ 04:00) (108/76 - 123/74)  RR: 18 (10-25-22 @ 04:00) (16 - 26)  SpO2: 99% (10-25-22 @ 04:00) (88% - 99%)  Wt(kg): --  I&O's Summary    25 Oct 2022 07:01  -  25 Oct 2022 11:44  --------------------------------------------------------  IN: 200 mL / OUT: 0 mL / NET: 200 mL        Appearance: Normal	  HEENT:   Normal oral mucosa, PERRL, EOMI	  Lymphatic: No lymphadenopathy  Cardiovascular: Normal S1 S2, No JVD, No murmurs, No edema  Respiratory: dec bs  Gastrointestinal:  Soft, Non-tender, + BS	    Neurologic: Non-focal  Extremities: Normal range of motion, No clubbing, cyanosis or edema  Vascular: Peripheral pulses palpable 2+ bilaterally    TELEMETRY: 	    ECG:  	  RADIOLOGY:  OTHER: 	  	  LABS:	 	    CARDIAC MARKERS:                                15.7   7.81  )-----------( 270      ( 25 Oct 2022 05:47 )             48.7     10-25    140  |  100  |  18  ----------------------------<  100<H>  3.6   |  24  |  0.84    Ca    10.1      25 Oct 2022 05:45  Phos  4.5     10-25  Mg     1.5     10-25    TPro  8.0  /  Alb  4.4  /  TBili  0.4  /  DBili  x   /  AST  27  /  ALT  30  /  AlkPhos  81  10-24    proBNP: Serum Pro-Brain Natriuretic Peptide: 45 pg/mL (10-24 @ 14:34)    Lipid Profile:   HgA1c:   TSH:

## 2022-10-25 NOTE — CONSULT NOTE ADULT - ASSESSMENT
47 y/o M with PMH of HTN, DM2, HLD. Presents with syncope. He was diagnosed with COVID last week of September, with no hospitalizations. Found to have mild hypoxia in ED - 88% RA, placed on 2LNC.

## 2022-10-25 NOTE — CONSULT NOTE ADULT - ASSESSMENT
46-year-old male with past medical history most significant for hypertension, diabetes who was admitted to the hospital due to dizziness and syncope.    #SARS-CoV-2/COVID-19 positive  Initial infection in September 2022  Unlikely that hypoxia is due to covid  Patient does not appear to have a bacterial pneumonia    Recommendations  Monitor off antibiotics  Cardiology and pulmonology work-up to evaluate hypoxia  Steriods per primary team/pulmonology 46-year-old male with past medical history most significant for hypertension, diabetes who was admitted to the hospital due to dizziness and syncope.    #Acute hypoxic respiratory failure  #SARS-CoV-2/COVID-19 positive  Initial COVID infection in September 2022  Unlikely that hypoxia is due to covid  Patient does not appear to have a bacterial or other infectious process at this time  Work-up pending    Recommendations  Monitor off antibiotics  No COVID treatments  Can come off COVID isolation due to old infection  Cardiology and pulmonology work-up to evaluate hypoxia  Steroids per primary team/pulmonology if needed, does not need for COVID (old infection)    Oliver Ervin MD  Division of Infectious Diseases

## 2022-10-25 NOTE — CONSULT NOTE ADULT - SUBJECTIVE AND OBJECTIVE BOX
Patient is a 46y old  Male who presents with a chief complaint of syncope (25 Oct 2022 11:44)    HPI:  45 y/o M with PMhx of HTN, DM2, HLD presents with syncope, which started 3 days ago. Patient states he had one episode of syncope after walking on a flat incline for 30 mins, associated with dyspnea, which is unusual for him. Shortly afterwards patient was in his living room with dizziness and had syncope as well. Later that afternoon he was walking across his bedroom, felt dizzy, passed out, and woke up with an abrasion on his upper lip. On Sunday pt had another episode of feeling dizzy, his friend who is a ED doc checked orthostatics which were 110/70 supine and 100/70 standing. Denies chest pain, chest palpitations, headache, vision changes, abd pain, n/v/d/f, vertigo, vision changes, loss of sensation, weakness, urinary or bowel changes, focal neurological deficits, head trauma. Patient states the only change was his medication Farixga which was changed from 5mg to 10mg  3 weeks ago. States his blood glucose was around 100. Patient states he was diagnosed with Covid last week of September, with no hospitalizations. States he follows up with outpatient Cardiologist. Non-smoker. Works at [employer], lives with his mother. Patient followed up with his PCP this am, with new ECG changes for t-wave inversions compared to prior ECG on 10/11/22.  (24 Oct 2022 18:21)    46-year-old male with past medical history most significant for hypertension, diabetes who was admitted to the hospital due to dizziness and syncope.    Patient reports that he had his first syncopal episode about 3 days prior to admission.  Reports that he was exercising and developed sudden shortness of breath, more than usual.  Upon completion of his exercise has started to feel dizzy and fainted.  Did not seek medical attention after this initial event.  Following day developed dizziness again.  Blood pressure was checked, negative orthostatic pressure.  Patient denied any other associated symptoms including chest pain, palpitations, headache, vision changes, systemic symptoms such as fever, chills.  Denies any recent medical changes last change was Farxiga about a month ago.  Patient reportedly tested positive for COVID at the end of September 2022.    In the ED, patient is afebrile vitals are stable, saturating well on 2 L supplemental oxygen.  Initial labs show no leukocytosis, BMP unremarkable.  Renal and hepatic function within normal limits.  SARS-CoV-2 PCR positive on admission.  Limited TTE shows no evidence for pericardial effusion.              prior hospital charts reviewed [x  ]  primary team notes reviewed [x  ]  other consultant notes reviewed [x  ]    PAST MEDICAL & SURGICAL HISTORY:  HTN (hypertension)      HLD (hyperlipidemia)      DM (diabetes mellitus)      No significant past surgical history          Allergies  No Known Allergies    ANTIMICROBIALS (past 90 days)  MEDICATIONS  (STANDING):          MEDICATIONS  (STANDING):  acetaminophen     Tablet .. 650 every 6 hours PRN  ALBUTerol    90 MICROgram(s) HFA Inhaler 2 every 6 hours PRN  aluminum hydroxide/magnesium hydroxide/simethicone Suspension 30 every 4 hours PRN  atorvastatin 40 at bedtime  dexAMETHasone     Tablet 6 daily  dextrose 50% Injectable 25 once  dextrose 50% Injectable 12.5 once  dextrose 50% Injectable 25 once  dextrose Oral Gel 15 once PRN  enoxaparin Injectable 40 every 12 hours  escitalopram 5 daily  glucagon  Injectable 1 once  hydrochlorothiazide 25 daily  insulin lispro (ADMELOG) corrective regimen sliding scale  three times a day before meals  insulin lispro (ADMELOG) corrective regimen sliding scale  at bedtime  losartan 100 daily  melatonin 3 at bedtime PRN  ondansetron Injectable 4 every 8 hours PRN    SOCIAL HISTORY:       FAMILY HISTORY:  Family history of early CAD (Father)      REVIEW OF SYSTEMS  [  ] ROS unobtainable because:    [  ] All other systems negative except as noted below:	    Constitutional:  [ ] fever [ ] chills  [ ] weight loss  [ ] weakness  Skin:  [ ] rash [ ] phlebitis	  Eyes: [ ] icterus [ ] pain  [ ] discharge	  ENMT: [ ] sore throat  [ ] thrush [ ] ulcers [ ] exudates  Respiratory: [ ] dyspnea [ ] hemoptysis [ ] cough [ ] sputum	  Cardiovascular:  [ ] chest pain [ ] palpitations [ ] edema	  Gastrointestinal:  [ ] nausea [ ] vomiting [ ] diarrhea [ ] constipation [ ] pain	  Genitourinary:  [ ] dysuria [ ] frequency [ ] hematuria [ ] discharge [ ] flank pain  [ ] incontinence  Musculoskeletal:  [ ] myalgias [ ] arthralgias [ ] arthritis  [ ] back pain  Neurological:  [ ] headache [ ] seizures  [ ] confusion/altered mental status  Psychiatric:  [ ] anxiety [ ] depression	  Hematology/Lymphatics:  [ ] lymphadenopathy  Endocrine:  [ ] adrenal [ ] thyroid  Allergic/Immunologic:	 [ ] transplant [ ] seasonal    Vital Signs Last 24 Hrs  T(F): 98.1 (10-25-22 @ 11:48), Max: 98.8 (10-24-22 @ 19:24)  Vital Signs Last 24 Hrs  HR: 70 (10-25-22 @ 04:00) (70 - 96)  BP: 123/74 (10-25-22 @ 04:00) (108/76 - 123/74)  RR: 18 (10-25-22 @ 11:48)  SpO2: 97% (10-25-22 @ 11:48) (88% - 99%)  Wt(kg): --    PHYSICAL EXAM:  Constitutional: non-toxic, no distress  HEAD/EYES: anicteric, no conjunctival injection  ENT:  supple, no thrush  Cardiovascular:   normal S1, S2, no murmur, no edema  Respiratory:  clear BS bilaterally, no wheezes, no rales  GI:  soft, non-tender, normal bowel sounds  :  no pink, no CVA tenderness  Musculoskeletal:  no synovitis, normal ROM  Neurologic: awake and alert, normal strength, no focal findings  Skin:  no rash, no erythema, no phlebitis  Heme/Onc: no lymphadenopathy   Psychiatric:  awake, alert, appropriate mood                            15.7   7.81  )-----------( 270      ( 25 Oct 2022 05:47 )             48.7   10-25    140  |  100  |  18  ----------------------------<  100<H>  3.6   |  24  |  0.84    Ca    10.1      25 Oct 2022 05:45  Phos  4.5     10-25  Mg     1.5     10-25    TPro  8.0  /  Alb  4.4  /  TBili  0.4  /  DBili  x   /  AST  27  /  ALT  30  /  AlkPhos  81  10-24    MICROBIOLOGY:              RADIOLOGY:  imaging below personally reviewed and agree with findings Patient is a 46y old  Male who presents with a chief complaint of syncope (25 Oct 2022 11:44)    HPI:  47 y/o M with PMhx of HTN, DM2, HLD presents with syncope, which started 3 days ago. Patient states he had one episode of syncope after walking on a flat incline for 30 mins, associated with dyspnea, which is unusual for him. Shortly afterwards patient was in his living room with dizziness and had syncope as well. Later that afternoon he was walking across his bedroom, felt dizzy, passed out, and woke up with an abrasion on his upper lip. On Sunday pt had another episode of feeling dizzy, his friend who is a ED doc checked orthostatics which were 110/70 supine and 100/70 standing. Denies chest pain, chest palpitations, headache, vision changes, abd pain, n/v/d/f, vertigo, vision changes, loss of sensation, weakness, urinary or bowel changes, focal neurological deficits, head trauma. Patient states the only change was his medication Farixga which was changed from 5mg to 10mg  3 weeks ago. States his blood glucose was around 100. Patient states he was diagnosed with Covid last week of September, with no hospitalizations. States he follows up with outpatient Cardiologist. Non-smoker. Works at [employer], lives with his mother. Patient followed up with his PCP this am, with new ECG changes for t-wave inversions compared to prior ECG on 10/11/22.  (24 Oct 2022 18:21)    46-year-old male with past medical history most significant for hypertension, diabetes who was admitted to the hospital due to dizziness and syncope.    Patient reports that he had his first syncopal episode about 3 days prior to admission.  Reports that he was exercising and developed sudden shortness of breath, more than usual.  Upon completion of his exercise has started to feel dizzy and fainted.  Did not seek medical attention after this initial event.  Following day developed dizziness again.  Blood pressure was checked, negative orthostatic pressure.  Patient denied any other associated symptoms including chest pain, palpitations, headache, vision changes, systemic symptoms such as fever, chills.  Denies any recent medical changes last change was Farxiga about a month ago.  Patient reportedly tested positive for COVID at the end of September 2022.    In the ED, patient is afebrile vitals are stable, saturating well on 2 L supplemental oxygen.  Initial labs show no leukocytosis, BMP unremarkable.  Renal and hepatic function within normal limits.  SARS-CoV-2 PCR positive on admission.  Limited TTE shows no evidence for pericardial effusion.              prior hospital charts reviewed [x  ]  primary team notes reviewed [x  ]  other consultant notes reviewed [x  ]    PAST MEDICAL & SURGICAL HISTORY:  HTN (hypertension)      HLD (hyperlipidemia)      DM (diabetes mellitus)      No significant past surgical history          Allergies  No Known Allergies    ANTIMICROBIALS (past 90 days)  MEDICATIONS  (STANDING):          MEDICATIONS  (STANDING):  acetaminophen     Tablet .. 650 every 6 hours PRN  ALBUTerol    90 MICROgram(s) HFA Inhaler 2 every 6 hours PRN  aluminum hydroxide/magnesium hydroxide/simethicone Suspension 30 every 4 hours PRN  atorvastatin 40 at bedtime  dexAMETHasone     Tablet 6 daily  dextrose 50% Injectable 25 once  dextrose 50% Injectable 12.5 once  dextrose 50% Injectable 25 once  dextrose Oral Gel 15 once PRN  enoxaparin Injectable 40 every 12 hours  escitalopram 5 daily  glucagon  Injectable 1 once  hydrochlorothiazide 25 daily  insulin lispro (ADMELOG) corrective regimen sliding scale  three times a day before meals  insulin lispro (ADMELOG) corrective regimen sliding scale  at bedtime  losartan 100 daily  melatonin 3 at bedtime PRN  ondansetron Injectable 4 every 8 hours PRN    SOCIAL HISTORY:       FAMILY HISTORY:  Family history of early CAD (Father)      REVIEW OF SYSTEMS  [  ] ROS unobtainable because:    [  ] All other systems negative except as noted below:	    Constitutional:  [ ] fever [ ] chills  [ ] weight loss  [ ] weakness  Skin:  [ ] rash [ ] phlebitis	  Eyes: [ ] icterus [ ] pain  [ ] discharge	  ENMT: [ ] sore throat  [ ] thrush [ ] ulcers [ ] exudates  Respiratory: [ ] dyspnea [ ] hemoptysis [ ] cough [ ] sputum	  Cardiovascular:  [ ] chest pain [ ] palpitations [ ] edema	  Gastrointestinal:  [ ] nausea [ ] vomiting [ ] diarrhea [ ] constipation [ ] pain	  Genitourinary:  [ ] dysuria [ ] frequency [ ] hematuria [ ] discharge [ ] flank pain  [ ] incontinence  Musculoskeletal:  [ ] myalgias [ ] arthralgias [ ] arthritis  [ ] back pain  Neurological:  [ ] headache [ ] seizures  [ ] confusion/altered mental status  Psychiatric:  [ ] anxiety [ ] depression	  Hematology/Lymphatics:  [ ] lymphadenopathy  Endocrine:  [ ] adrenal [ ] thyroid  Allergic/Immunologic:	 [ ] transplant [ ] seasonal    Vital Signs Last 24 Hrs  T(F): 98.1 (10-25-22 @ 11:48), Max: 98.8 (10-24-22 @ 19:24)  Vital Signs Last 24 Hrs  HR: 70 (10-25-22 @ 04:00) (70 - 96)  BP: 123/74 (10-25-22 @ 04:00) (108/76 - 123/74)  RR: 18 (10-25-22 @ 11:48)  SpO2: 97% (10-25-22 @ 11:48) (88% - 99%)  Wt(kg): --    PHYSICAL EXAM:  Constitutional: non-toxic, no distress  HEAD/EYES: anicteric, no conjunctival injection  Cardiovascular:   normal S1, S2, no m/r/g  Respiratory:  CTA B/L  GI:  soft, non-tender, normal bowel sounds  :  no pink, no CVA tenderness  Musculoskeletal:  no synovitis, normal ROM  Neurologic: awake and alert, normal strength, no focal findings  Skin:  no rash, no erythema, no phlebitis  Heme/Onc: no lymphadenopathy   Psychiatric:  awake, alert, appropriate mood                            15.7   7.81  )-----------( 270      ( 25 Oct 2022 05:47 )             48.7   10-25    140  |  100  |  18  ----------------------------<  100<H>  3.6   |  24  |  0.84    Ca    10.1      25 Oct 2022 05:45  Phos  4.5     10-25  Mg     1.5     10-25    TPro  8.0  /  Alb  4.4  /  TBili  0.4  /  DBili  x   /  AST  27  /  ALT  30  /  AlkPhos  81  10-24    MICROBIOLOGY:              RADIOLOGY:  imaging below personally reviewed and agree with findings

## 2022-10-25 NOTE — CONSULT NOTE ADULT - PROBLEM SELECTOR RECOMMENDATION 2
+COVID as an outpatient end of September  -No indication for Remdesivir at this time, dx almost 1 month ago

## 2022-10-26 LAB
ANION GAP SERPL CALC-SCNC: 17 MMOL/L — SIGNIFICANT CHANGE UP (ref 5–17)
BUN SERPL-MCNC: 22 MG/DL — SIGNIFICANT CHANGE UP (ref 7–23)
CALCIUM SERPL-MCNC: 10.3 MG/DL — SIGNIFICANT CHANGE UP (ref 8.4–10.5)
CHLORIDE SERPL-SCNC: 101 MMOL/L — SIGNIFICANT CHANGE UP (ref 96–108)
CO2 SERPL-SCNC: 21 MMOL/L — LOW (ref 22–31)
CREAT SERPL-MCNC: 0.77 MG/DL — SIGNIFICANT CHANGE UP (ref 0.5–1.3)
EGFR: 112 ML/MIN/1.73M2 — SIGNIFICANT CHANGE UP
GAS PNL BLDV: SIGNIFICANT CHANGE UP
GLUCOSE BLDC GLUCOMTR-MCNC: 157 MG/DL — HIGH (ref 70–99)
GLUCOSE BLDC GLUCOMTR-MCNC: 188 MG/DL — HIGH (ref 70–99)
GLUCOSE BLDC GLUCOMTR-MCNC: 213 MG/DL — HIGH (ref 70–99)
GLUCOSE BLDC GLUCOMTR-MCNC: 248 MG/DL — HIGH (ref 70–99)
GLUCOSE SERPL-MCNC: 152 MG/DL — HIGH (ref 70–99)
HCT VFR BLD CALC: 48 % — SIGNIFICANT CHANGE UP (ref 39–50)
HGB BLD-MCNC: 16.4 G/DL — SIGNIFICANT CHANGE UP (ref 13–17)
LACTATE SERPL-SCNC: 1 MMOL/L — SIGNIFICANT CHANGE UP (ref 0.5–2)
MAGNESIUM SERPL-MCNC: 1.9 MG/DL — SIGNIFICANT CHANGE UP (ref 1.6–2.6)
MCHC RBC-ENTMCNC: 27.8 PG — SIGNIFICANT CHANGE UP (ref 27–34)
MCHC RBC-ENTMCNC: 34.2 GM/DL — SIGNIFICANT CHANGE UP (ref 32–36)
MCV RBC AUTO: 81.4 FL — SIGNIFICANT CHANGE UP (ref 80–100)
MRSA PCR RESULT.: SIGNIFICANT CHANGE UP
NRBC # BLD: 0 /100 WBCS — SIGNIFICANT CHANGE UP (ref 0–0)
PLATELET # BLD AUTO: 300 K/UL — SIGNIFICANT CHANGE UP (ref 150–400)
POTASSIUM SERPL-MCNC: 3.9 MMOL/L — SIGNIFICANT CHANGE UP (ref 3.5–5.3)
POTASSIUM SERPL-SCNC: 3.9 MMOL/L — SIGNIFICANT CHANGE UP (ref 3.5–5.3)
RBC # BLD: 5.9 M/UL — HIGH (ref 4.2–5.8)
RBC # FLD: 13.6 % — SIGNIFICANT CHANGE UP (ref 10.3–14.5)
S AUREUS DNA NOSE QL NAA+PROBE: SIGNIFICANT CHANGE UP
SODIUM SERPL-SCNC: 139 MMOL/L — SIGNIFICANT CHANGE UP (ref 135–145)
WBC # BLD: 11.39 K/UL — HIGH (ref 3.8–10.5)
WBC # FLD AUTO: 11.39 K/UL — HIGH (ref 3.8–10.5)

## 2022-10-26 PROCEDURE — 99232 SBSQ HOSP IP/OBS MODERATE 35: CPT

## 2022-10-26 RX ORDER — ALPRAZOLAM 0.25 MG
0.25 TABLET ORAL ONCE
Refills: 0 | Status: DISCONTINUED | OUTPATIENT
Start: 2022-10-26 | End: 2022-10-26

## 2022-10-26 RX ADMIN — Medication 1: at 07:33

## 2022-10-26 RX ADMIN — Medication 2: at 16:43

## 2022-10-26 RX ADMIN — Medication 2: at 11:44

## 2022-10-26 RX ADMIN — ATORVASTATIN CALCIUM 40 MILLIGRAM(S): 80 TABLET, FILM COATED ORAL at 22:36

## 2022-10-26 RX ADMIN — ENOXAPARIN SODIUM 40 MILLIGRAM(S): 100 INJECTION SUBCUTANEOUS at 05:36

## 2022-10-26 RX ADMIN — Medication 3 MILLIGRAM(S): at 22:36

## 2022-10-26 RX ADMIN — LOSARTAN POTASSIUM 100 MILLIGRAM(S): 100 TABLET, FILM COATED ORAL at 05:37

## 2022-10-26 RX ADMIN — Medication 0.25 MILLIGRAM(S): at 10:56

## 2022-10-26 RX ADMIN — ENOXAPARIN SODIUM 40 MILLIGRAM(S): 100 INJECTION SUBCUTANEOUS at 17:58

## 2022-10-26 RX ADMIN — Medication 6 MILLIGRAM(S): at 05:37

## 2022-10-26 RX ADMIN — CHLORHEXIDINE GLUCONATE 1 APPLICATION(S): 213 SOLUTION TOPICAL at 05:35

## 2022-10-26 RX ADMIN — Medication 100 MILLIGRAM(S): at 22:36

## 2022-10-26 RX ADMIN — ESCITALOPRAM OXALATE 5 MILLIGRAM(S): 10 TABLET, FILM COATED ORAL at 11:46

## 2022-10-26 RX ADMIN — Medication 100 MILLIGRAM(S): at 14:23

## 2022-10-26 NOTE — PROVIDER CONTACT NOTE (CRITICAL VALUE NOTIFICATION) - ASSESSMENT
Behavioral Services  Medicare Certification Upon Admission    I certify that this patient's inpatient psychiatric hospital admission is medically necessary for:    [x] (1) Treatment which could reasonably be expected to improve this patient's condition,       [x] (2) Or for diagnostic study;     AND     [x](2) The inpatient psychiatric services are provided while the individual is under the care of a physician and are included in the individualized plan of care.     Estimated length of stay/service 2-5 days    Plan for post-hospital care outpatient services      Electronically signed by SHAILA Soriano CNP on 8/3/2022 at 12:56 PM Patient sitting upright in bed, VSS, asymptomatic.

## 2022-10-26 NOTE — PROGRESS NOTE ADULT - SUBJECTIVE AND OBJECTIVE BOX
Follow-up Pulm Progress Note    No new respiratory events overnight.  Denies SOB/CP.   Sats mid 90s on 2LNC   Endorses mild dry cough     Medications:  MEDICATIONS  (STANDING):  atorvastatin 40 milliGRAM(s) Oral at bedtime  benzonatate 100 milliGRAM(s) Oral three times a day  chlorhexidine 2% Cloths 1 Application(s) Topical <User Schedule>  dexAMETHasone     Tablet 6 milliGRAM(s) Oral daily  dextrose 5%. 1000 milliLiter(s) (50 mL/Hr) IV Continuous <Continuous>  dextrose 5%. 1000 milliLiter(s) (100 mL/Hr) IV Continuous <Continuous>  dextrose 50% Injectable 25 Gram(s) IV Push once  dextrose 50% Injectable 12.5 Gram(s) IV Push once  dextrose 50% Injectable 25 Gram(s) IV Push once  enoxaparin Injectable 40 milliGRAM(s) SubCutaneous every 12 hours  escitalopram 5 milliGRAM(s) Oral daily  glucagon  Injectable 1 milliGRAM(s) IntraMuscular once  insulin lispro (ADMELOG) corrective regimen sliding scale   SubCutaneous three times a day before meals  insulin lispro (ADMELOG) corrective regimen sliding scale   SubCutaneous at bedtime  losartan 100 milliGRAM(s) Oral daily    MEDICATIONS  (PRN):  acetaminophen     Tablet .. 650 milliGRAM(s) Oral every 6 hours PRN Temp greater or equal to 38C (100.4F), Mild Pain (1 - 3)  ALBUTerol    90 MICROgram(s) HFA Inhaler 2 Puff(s) Inhalation every 6 hours PRN Shortness of Breath and/or Wheezing  aluminum hydroxide/magnesium hydroxide/simethicone Suspension 30 milliLiter(s) Oral every 4 hours PRN Dyspepsia  dextrose Oral Gel 15 Gram(s) Oral once PRN Blood Glucose LESS THAN 70 milliGRAM(s)/deciliter  melatonin 3 milliGRAM(s) Oral at bedtime PRN Insomnia  ondansetron Injectable 4 milliGRAM(s) IV Push every 8 hours PRN Nausea and/or Vomiting          Vital Signs Last 24 Hrs  T(C): 36.5 (26 Oct 2022 04:14), Max: 36.9 (25 Oct 2022 20:28)  T(F): 97.7 (26 Oct 2022 04:14), Max: 98.5 (25 Oct 2022 20:28)  HR: 70 (26 Oct 2022 04:14) (70 - 85)  BP: 114/69 (26 Oct 2022 04:14) (114/68 - 135/86)  BP(mean): --  RR: 18 (26 Oct 2022 04:14) (18 - 18)  SpO2: 94% (26 Oct 2022 04:14) (94% - 98%)    Parameters below as of 26 Oct 2022 04:14  Patient On (Oxygen Delivery Method): nasal cannula  O2 Flow (L/min): 2        VBG pH 7.36 10-26 @ 07:50    VBG pCO2 44 10-26 @ 07:50    VBG O2 sat 89.6 10-26 @ 07:50    VBG lactate 3.4 10-26 @ 07:50      10-25 @ 07:01  -  10-26 @ 07:00  --------------------------------------------------------  IN: 800 mL / OUT: 0 mL / NET: 800 mL          LABS:                        16.4   11.39 )-----------( 300      ( 26 Oct 2022 09:40 )             48.0     10-26    139  |  101  |  22  ----------------------------<  152<H>  3.9   |  21<L>  |  0.77    Ca    10.3      26 Oct 2022 07:16  Phos  4.5     10-25  Mg     1.9     10-26    TPro  8.0  /  Alb  4.4  /  TBili  0.4  /  DBili  x   /  AST  27  /  ALT  30  /  AlkPhos  81  10-24          CAPILLARY BLOOD GLUCOSE      POCT Blood Glucose.: 157 mg/dL (26 Oct 2022 07:27)    PT/INR - ( 24 Oct 2022 14:34 )   PT: 11.9 sec;   INR: 1.03 ratio         PTT - ( 24 Oct 2022 14:34 )  PTT:28.5 sec    Procalcitonin, Serum: 0.04 ng/mL (10-25-22 @ 05:45)    Serum Pro-Brain Natriuretic Peptide: 45 pg/mL (10-24-22 @ 14:34)          Physical Examination:  PULM: Clear to auscultation bilaterally, no significant sputum production  CVS: S1, S2 heard        RADIOLOGY REVIEWED    CTA chest: < from: CT Angio Chest PE Protocol w/ IV Cont (10.25.22 @ 18:14) >  FINDINGS:    CTA: The contrast bolus is satisfactory. There are no filling defects in   the pulmonary arteryor its branches. The cardiac chambers are normal in   size. There is no pericardial effusion. Aorta is normal in course and   caliber.    Lungs/Airways/Pleura: Mild-moderate respiratory motion. Central airways   are patent. No pleural effusion.    Mediastinum/Lymph nodes: No thoracic adenopathy.    Upper Abdomen: Unremarkable.    Bones and Soft Tissues: No aggressive osseous lesions.    IMPRESSION:    No pulmonary thromboembolism. No pneumonia.      < end of copied text >

## 2022-10-26 NOTE — PROGRESS NOTE ADULT - SUBJECTIVE AND OBJECTIVE BOX
Subjective:   Patient seen yesterday in person at the bedside on October 25, 2022 and today the chart was reviewed and discussed with the staff  .    The patient on October 25  offered no complaints of shortness of breath or chest pain dizziness.  He has no recurrence of syncope.  Telemetry monitoring has been unremarkable.  He is in isolation for COVID-19 and has been hypoxic.  His cardiac situation on October 26 again remained stable with normal monitoring of his heart rate on telemetry, no arrhythmia.  MEDICATIONS:  MEDICATIONS  (STANDING):  atorvastatin 40 milliGRAM(s) Oral at bedtime  benzonatate 100 milliGRAM(s) Oral three times a day  chlorhexidine 2% Cloths 1 Application(s) Topical <User Schedule>  dextrose 5%. 1000 milliLiter(s) (50 mL/Hr) IV Continuous <Continuous>  dextrose 5%. 1000 milliLiter(s) (100 mL/Hr) IV Continuous <Continuous>  dextrose 50% Injectable 25 Gram(s) IV Push once  dextrose 50% Injectable 12.5 Gram(s) IV Push once  dextrose 50% Injectable 25 Gram(s) IV Push once  enoxaparin Injectable 40 milliGRAM(s) SubCutaneous every 12 hours  escitalopram 5 milliGRAM(s) Oral daily  glucagon  Injectable 1 milliGRAM(s) IntraMuscular once  insulin lispro (ADMELOG) corrective regimen sliding scale   SubCutaneous three times a day before meals  insulin lispro (ADMELOG) corrective regimen sliding scale   SubCutaneous at bedtime  losartan 100 milliGRAM(s) Oral daily      PHYSICAL EXAM:  T(C): 36.6 (10-26-22 @ 11:14), Max: 36.9 (10-25-22 @ 20:28)  HR: 75 (10-26-22 @ 11:14) (70 - 85)  BP: 117/75 (10-26-22 @ 11:14) (114/68 - 135/86)  RR: 20 (10-26-22 @ 11:14) (18 - 20)  SpO2: 92% (10-26-22 @ 11:14) (92% - 98%)  Wt(kg): --  I&O's Summary    25 Oct 2022 07:01  -  26 Oct 2022 07:00  --------------------------------------------------------  IN: 800 mL / OUT: 0 mL / NET: 800 mL    26 Oct 2022 07:01  -  26 Oct 2022 15:38  --------------------------------------------------------  IN: 400 mL / OUT: 0 mL / NET: 400 mL              LABS:    CARDIAC MARKERS:                                16.4   11.39 )-----------( 300      ( 26 Oct 2022 09:40 )             48.0     10-26    139  |  101  |  22  ----------------------------<  152<H>  3.9   |  21<L>  |  0.77    Ca    10.3      26 Oct 2022 07:16  Phos  4.5     10-25  Mg     1.9     10-26      proBNP:   Lipid Profile:   HgA1c:   TSH:           TELEMETRY: 	    ECG:  < from: 12 Lead ECG (10.24.22 @ 13:05) >  Diagnosis Line NORMAL SINUS RHYTHM  T WAVE ABNORMALITY, CONSIDER INFERIOR ISCHEMIA  ABNORMAL ECG    RADIOLOGY:   x< from: Xray Chest 1 View- PORTABLE-Urgent (Xray Chest 1 View- PORTABLE-Urgent .) (10.24.22 @ 13:42) >  FINDINGS:      The heart size cannot be assessed on this projection.  The lungs are clear. Low lung volumes.  No pleural effusion or pneumothorax.    IMPRESSION:  Clear lungs.    < from: POCUS ED TTE 2D F/U, Limited w/o Cont. (10.24.22 @ 14:57) >  INTERPRETATION:  A focused transthoracic cardiac ultrasound examination   was performed.  No pericardial effusion was present.  No global wall motion abnormality was identified.  The IVC is not plethoric. The right ventricle is not well visualized.  The apical and axillary lung fields are a line predominant with lung   sliding, bilaterally.  No visible pleural effusions.    IMPRESSION:  No Pericardial Effusion.    --- End of Report ---      < end of copied text >

## 2022-10-26 NOTE — PROGRESS NOTE ADULT - SUBJECTIVE AND OBJECTIVE BOX
Patient is a 46y old  Male who presents with a chief complaint of syncope (26 Oct 2022 09:54)      DATE OF SERVICE: 10-26-22 @ 13:44    SUBJECTIVE / OVERNIGHT EVENTS: overnight events noted    ROS:  Resp: No cough no sputum production  CVS: No chest pain no palpitations no orthopnea  GI: no N/V/D  : no dysuria, no hematuria  Neuro: no weakness no paresthesias          MEDICATIONS  (STANDING):  atorvastatin 40 milliGRAM(s) Oral at bedtime  benzonatate 100 milliGRAM(s) Oral three times a day  chlorhexidine 2% Cloths 1 Application(s) Topical <User Schedule>  dextrose 5%. 1000 milliLiter(s) (50 mL/Hr) IV Continuous <Continuous>  dextrose 5%. 1000 milliLiter(s) (100 mL/Hr) IV Continuous <Continuous>  dextrose 50% Injectable 25 Gram(s) IV Push once  dextrose 50% Injectable 12.5 Gram(s) IV Push once  dextrose 50% Injectable 25 Gram(s) IV Push once  enoxaparin Injectable 40 milliGRAM(s) SubCutaneous every 12 hours  escitalopram 5 milliGRAM(s) Oral daily  glucagon  Injectable 1 milliGRAM(s) IntraMuscular once  insulin lispro (ADMELOG) corrective regimen sliding scale   SubCutaneous three times a day before meals  insulin lispro (ADMELOG) corrective regimen sliding scale   SubCutaneous at bedtime  losartan 100 milliGRAM(s) Oral daily    MEDICATIONS  (PRN):  acetaminophen     Tablet .. 650 milliGRAM(s) Oral every 6 hours PRN Temp greater or equal to 38C (100.4F), Mild Pain (1 - 3)  ALBUTerol    90 MICROgram(s) HFA Inhaler 2 Puff(s) Inhalation every 6 hours PRN Shortness of Breath and/or Wheezing  aluminum hydroxide/magnesium hydroxide/simethicone Suspension 30 milliLiter(s) Oral every 4 hours PRN Dyspepsia  dextrose Oral Gel 15 Gram(s) Oral once PRN Blood Glucose LESS THAN 70 milliGRAM(s)/deciliter  melatonin 3 milliGRAM(s) Oral at bedtime PRN Insomnia  ondansetron Injectable 4 milliGRAM(s) IV Push every 8 hours PRN Nausea and/or Vomiting        CAPILLARY BLOOD GLUCOSE      POCT Blood Glucose.: 213 mg/dL (26 Oct 2022 11:23)  POCT Blood Glucose.: 157 mg/dL (26 Oct 2022 07:27)  POCT Blood Glucose.: 160 mg/dL (25 Oct 2022 21:43)  POCT Blood Glucose.: 175 mg/dL (25 Oct 2022 16:15)    I&O's Summary    25 Oct 2022 07:01  -  26 Oct 2022 07:00  --------------------------------------------------------  IN: 800 mL / OUT: 0 mL / NET: 800 mL    26 Oct 2022 07:01  -  26 Oct 2022 13:45  --------------------------------------------------------  IN: 400 mL / OUT: 0 mL / NET: 400 mL        Vital Signs Last 24 Hrs  T(C): 36.6 (26 Oct 2022 11:14), Max: 36.9 (25 Oct 2022 20:28)  T(F): 97.9 (26 Oct 2022 11:14), Max: 98.5 (25 Oct 2022 20:28)  HR: 75 (26 Oct 2022 11:14) (70 - 85)  BP: 117/75 (26 Oct 2022 11:14) (114/68 - 135/86)  BP(mean): --  RR: 20 (26 Oct 2022 11:14) (18 - 20)  SpO2: 92% (26 Oct 2022 11:14) (92% - 98%)    PHYSICAL EXAM:  GENERAL: in no apparent distress  HEAD:  Atraumatic, Normocephalic  EYES: EOMI, PERRLA, sclera clear  NECK: Supple, No JVD  CHEST/LUNG: clear b/l, no wheeze  HEART: S1 S2; no murmurs appreciated  ABDOMEN: Soft, Nontender  EXTREMITIES:   no edema  NEUROLOGY: AO x 3 non-focal  SKIN: No rashes or lesions    LABS:                        16.4   11.39 )-----------( 300      ( 26 Oct 2022 09:40 )             48.0     10-26    139  |  101  |  22  ----------------------------<  152<H>  3.9   |  21<L>  |  0.77    Ca    10.3      26 Oct 2022 07:16  Phos  4.5     10-25  Mg     1.9     10-26    TPro  8.0  /  Alb  4.4  /  TBili  0.4  /  DBili  x   /  AST  27  /  ALT  30  /  AlkPhos  81  10-24    PT/INR - ( 24 Oct 2022 14:34 )   PT: 11.9 sec;   INR: 1.03 ratio         PTT - ( 24 Oct 2022 14:34 )  PTT:28.5 sec            All consultant(s) notes reviewed and care discussed with other providers        Contact Number, Dr Perdue [phone]

## 2022-10-27 VITALS
DIASTOLIC BLOOD PRESSURE: 80 MMHG | OXYGEN SATURATION: 98 % | HEART RATE: 70 BPM | SYSTOLIC BLOOD PRESSURE: 123 MMHG | RESPIRATION RATE: 18 BRPM | TEMPERATURE: 98 F

## 2022-10-27 LAB
GLUCOSE BLDC GLUCOMTR-MCNC: 163 MG/DL — HIGH (ref 70–99)
GLUCOSE BLDC GLUCOMTR-MCNC: 167 MG/DL — HIGH (ref 70–99)

## 2022-10-27 PROCEDURE — 83605 ASSAY OF LACTIC ACID: CPT

## 2022-10-27 PROCEDURE — 93308 TTE F-UP OR LMTD: CPT

## 2022-10-27 PROCEDURE — 93306 TTE W/DOPPLER COMPLETE: CPT | Mod: 26

## 2022-10-27 PROCEDURE — 83880 ASSAY OF NATRIURETIC PEPTIDE: CPT

## 2022-10-27 PROCEDURE — 82962 GLUCOSE BLOOD TEST: CPT

## 2022-10-27 PROCEDURE — 71275 CT ANGIOGRAPHY CHEST: CPT

## 2022-10-27 PROCEDURE — 83036 HEMOGLOBIN GLYCOSYLATED A1C: CPT

## 2022-10-27 PROCEDURE — 84132 ASSAY OF SERUM POTASSIUM: CPT

## 2022-10-27 PROCEDURE — 82947 ASSAY GLUCOSE BLOOD QUANT: CPT

## 2022-10-27 PROCEDURE — 85014 HEMATOCRIT: CPT

## 2022-10-27 PROCEDURE — 85379 FIBRIN DEGRADATION QUANT: CPT

## 2022-10-27 PROCEDURE — 82803 BLOOD GASES ANY COMBINATION: CPT

## 2022-10-27 PROCEDURE — 83735 ASSAY OF MAGNESIUM: CPT

## 2022-10-27 PROCEDURE — 82330 ASSAY OF CALCIUM: CPT

## 2022-10-27 PROCEDURE — 87640 STAPH A DNA AMP PROBE: CPT

## 2022-10-27 PROCEDURE — 84484 ASSAY OF TROPONIN QUANT: CPT

## 2022-10-27 PROCEDURE — 85018 HEMOGLOBIN: CPT

## 2022-10-27 PROCEDURE — 84295 ASSAY OF SERUM SODIUM: CPT

## 2022-10-27 PROCEDURE — 87637 SARSCOV2&INF A&B&RSV AMP PRB: CPT

## 2022-10-27 PROCEDURE — 99285 EMERGENCY DEPT VISIT HI MDM: CPT | Mod: 25

## 2022-10-27 PROCEDURE — 93306 TTE W/DOPPLER COMPLETE: CPT

## 2022-10-27 PROCEDURE — 85652 RBC SED RATE AUTOMATED: CPT

## 2022-10-27 PROCEDURE — 83615 LACTATE (LD) (LDH) ENZYME: CPT

## 2022-10-27 PROCEDURE — 87641 MR-STAPH DNA AMP PROBE: CPT

## 2022-10-27 PROCEDURE — 85730 THROMBOPLASTIN TIME PARTIAL: CPT

## 2022-10-27 PROCEDURE — 71045 X-RAY EXAM CHEST 1 VIEW: CPT

## 2022-10-27 PROCEDURE — 84145 PROCALCITONIN (PCT): CPT

## 2022-10-27 PROCEDURE — 80061 LIPID PANEL: CPT

## 2022-10-27 PROCEDURE — 85027 COMPLETE CBC AUTOMATED: CPT

## 2022-10-27 PROCEDURE — 85610 PROTHROMBIN TIME: CPT

## 2022-10-27 PROCEDURE — 85025 COMPLETE CBC W/AUTO DIFF WBC: CPT

## 2022-10-27 PROCEDURE — 97162 PT EVAL MOD COMPLEX 30 MIN: CPT

## 2022-10-27 PROCEDURE — 82435 ASSAY OF BLOOD CHLORIDE: CPT

## 2022-10-27 PROCEDURE — 80053 COMPREHEN METABOLIC PANEL: CPT

## 2022-10-27 PROCEDURE — 96374 THER/PROPH/DIAG INJ IV PUSH: CPT

## 2022-10-27 PROCEDURE — 84100 ASSAY OF PHOSPHORUS: CPT

## 2022-10-27 PROCEDURE — 80048 BASIC METABOLIC PNL TOTAL CA: CPT

## 2022-10-27 PROCEDURE — 86140 C-REACTIVE PROTEIN: CPT

## 2022-10-27 PROCEDURE — 84443 ASSAY THYROID STIM HORMONE: CPT

## 2022-10-27 PROCEDURE — 36415 COLL VENOUS BLD VENIPUNCTURE: CPT

## 2022-10-27 RX ORDER — ALBUTEROL 90 UG/1
2 AEROSOL, METERED ORAL
Qty: 1 | Refills: 0
Start: 2022-10-27 | End: 2022-11-25

## 2022-10-27 RX ORDER — LANOLIN ALCOHOL/MO/W.PET/CERES
1 CREAM (GRAM) TOPICAL
Qty: 0 | Refills: 0 | DISCHARGE
Start: 2022-10-27

## 2022-10-27 RX ORDER — LOSARTAN POTASSIUM 100 MG/1
1 TABLET, FILM COATED ORAL
Qty: 30 | Refills: 0
Start: 2022-10-27 | End: 2022-11-25

## 2022-10-27 RX ORDER — ACETAMINOPHEN 500 MG
2 TABLET ORAL
Qty: 0 | Refills: 0 | DISCHARGE
Start: 2022-10-27

## 2022-10-27 RX ORDER — LOSARTAN/HYDROCHLOROTHIAZIDE 100MG-25MG
1 TABLET ORAL
Qty: 0 | Refills: 0 | DISCHARGE

## 2022-10-27 RX ADMIN — LOSARTAN POTASSIUM 100 MILLIGRAM(S): 100 TABLET, FILM COATED ORAL at 05:27

## 2022-10-27 RX ADMIN — Medication 1: at 07:55

## 2022-10-27 RX ADMIN — ESCITALOPRAM OXALATE 5 MILLIGRAM(S): 10 TABLET, FILM COATED ORAL at 11:43

## 2022-10-27 RX ADMIN — CHLORHEXIDINE GLUCONATE 1 APPLICATION(S): 213 SOLUTION TOPICAL at 05:27

## 2022-10-27 RX ADMIN — Medication 100 MILLIGRAM(S): at 05:27

## 2022-10-27 RX ADMIN — Medication 100 MILLIGRAM(S): at 14:34

## 2022-10-27 RX ADMIN — Medication 1: at 11:44

## 2022-10-27 RX ADMIN — ENOXAPARIN SODIUM 40 MILLIGRAM(S): 100 INJECTION SUBCUTANEOUS at 05:28

## 2022-10-27 NOTE — DISCHARGE NOTE NURSING/CASE MANAGEMENT/SOCIAL WORK - PATIENT PORTAL LINK FT
You can access the FollowMyHealth Patient Portal offered by [facility] by registering at the following website: [email or web address]. By joining [employer]’s FollowMyHealth portal, you will also be able to view your health information using other applications (apps) compatible with our system.

## 2022-10-27 NOTE — PROGRESS NOTE ADULT - SUBJECTIVE AND OBJECTIVE BOX
Follow-up Pulm Progress Note    Denies SOB/CP  Has been on RA since yesterday afternoon   Has been using incentive spirometry   Sats 98% RA    Medications:  MEDICATIONS  (STANDING):  atorvastatin 40 milliGRAM(s) Oral at bedtime  benzonatate 100 milliGRAM(s) Oral three times a day  chlorhexidine 2% Cloths 1 Application(s) Topical <User Schedule>  dextrose 5%. 1000 milliLiter(s) (50 mL/Hr) IV Continuous <Continuous>  dextrose 5%. 1000 milliLiter(s) (100 mL/Hr) IV Continuous <Continuous>  dextrose 50% Injectable 25 Gram(s) IV Push once  dextrose 50% Injectable 12.5 Gram(s) IV Push once  dextrose 50% Injectable 25 Gram(s) IV Push once  enoxaparin Injectable 40 milliGRAM(s) SubCutaneous every 12 hours  escitalopram 5 milliGRAM(s) Oral daily  glucagon  Injectable 1 milliGRAM(s) IntraMuscular once  insulin lispro (ADMELOG) corrective regimen sliding scale   SubCutaneous three times a day before meals  insulin lispro (ADMELOG) corrective regimen sliding scale   SubCutaneous at bedtime  losartan 100 milliGRAM(s) Oral daily    MEDICATIONS  (PRN):  acetaminophen     Tablet .. 650 milliGRAM(s) Oral every 6 hours PRN Temp greater or equal to 38C (100.4F), Mild Pain (1 - 3)  ALBUTerol    90 MICROgram(s) HFA Inhaler 2 Puff(s) Inhalation every 6 hours PRN Shortness of Breath and/or Wheezing  aluminum hydroxide/magnesium hydroxide/simethicone Suspension 30 milliLiter(s) Oral every 4 hours PRN Dyspepsia  dextrose Oral Gel 15 Gram(s) Oral once PRN Blood Glucose LESS THAN 70 milliGRAM(s)/deciliter  melatonin 3 milliGRAM(s) Oral at bedtime PRN Insomnia  ondansetron Injectable 4 milliGRAM(s) IV Push every 8 hours PRN Nausea and/or Vomiting          Vital Signs Last 24 Hrs  T(C): 36.8 (27 Oct 2022 11:25), Max: 36.8 (27 Oct 2022 11:25)  T(F): 98.3 (27 Oct 2022 11:25), Max: 98.3 (27 Oct 2022 11:25)  HR: 70 (27 Oct 2022 11:25) (69 - 99)  BP: 123/80 (27 Oct 2022 11:25) (114/72 - 137/88)  BP(mean): --  RR: 18 (27 Oct 2022 11:25) (18 - 20)  SpO2: 98% (27 Oct 2022 11:25) (92% - 99%)    Parameters below as of 27 Oct 2022 11:25  Patient On (Oxygen Delivery Method): room air          VBG pH 7.36 10-26 @ 07:50    VBG pCO2 44 10-26 @ 07:50    VBG O2 sat 89.6 10-26 @ 07:50    VBG lactate 3.4 10-26 @ 07:50      10-26 @ 07:01  -  10-27 @ 07:00  --------------------------------------------------------  IN: 640 mL / OUT: 0 mL / NET: 640 mL          LABS:                        16.4   11.39 )-----------( 300      ( 26 Oct 2022 09:40 )             48.0     10-26    139  |  101  |  22  ----------------------------<  152<H>  3.9   |  21<L>  |  0.77    Ca    10.3      26 Oct 2022 07:16  Mg     1.9     10-26            CAPILLARY BLOOD GLUCOSE      POCT Blood Glucose.: 163 mg/dL (27 Oct 2022 11:32)        Procalcitonin, Serum: 0.04 ng/mL (10-25-22 @ 05:45)    Serum Pro-Brain Natriuretic Peptide: 45 pg/mL (10-24-22 @ 14:34)        Physical Examination:  PULM: Clear to auscultation bilaterally, no significant sputum production  CVS: S1, S2 heard        RADIOLOGY REVIEWED    CTA chest: < from: CT Angio Chest PE Protocol w/ IV Cont (10.25.22 @ 18:14) >  FINDINGS:    CTA: The contrast bolus is satisfactory. There are no filling defects in   the pulmonary arteryor its branches. The cardiac chambers are normal in   size. There is no pericardial effusion. Aorta is normal in course and   caliber.    Lungs/Airways/Pleura: Mild-moderate respiratory motion. Central airways   are patent. No pleural effusion.    Mediastinum/Lymph nodes: No thoracic adenopathy.    Upper Abdomen: Unremarkable.    Bones and Soft Tissues: No aggressive osseous lesions.    IMPRESSION:    No pulmonary thromboembolism. No pneumonia.      < end of copied text >

## 2022-10-27 NOTE — PROGRESS NOTE ADULT - ASSESSMENT
Presently the patient has no significant cardiac issues.  I doubt the patient had ACS.  His main problem seems to be around COVID-19 periods of hypoxia.  He has no arrhythmia, stable vital signs and normal LV function on echo from the emergency room.      Recommendations   continue as per medicine and infectious disease   at this point no further cardiac work-up is needed unless is a change in his status.    (He has had a nuclear stress test approximately 3 weeks ago which was normal)
47 y/o M with PMhx of HTN, DM2, HLD admitted for acute respiratory failure with hypoxia with syncopal work-up, likely due to COVID infection, rule out ACS given comorbidities.     Problem/Plan - 1:  ·  Problem: Syncope.   ·  Plan: New onset of syncope with no clear etiology. Mg 1.4, POCUS ECHO negative for pleural effusion, cannot confirm on structural heart disease. ECG: T-wave inversion, new findings compared to previous ECG of 10/11/22. +covid infection. D-dimer negative. Trops negative x2    -Tele monitoring   -Maintain electrolytes, f/u with daily BMP   -Treat underlying cause of acute respiratory failure due to Covid   -Maintain BG of 140-180mg/dl during hospitalization   -Patient can benefit from ECHO TTE, will f/u with Cardiology recs  -Cardiology consultation appreciated   continue to follow recommendations .     Problem/Plan - 2:  ·  Problem: Acute respiratory failure with hypoxia.   ·  Plan: Covid +ve, requiring supplemental O2. CXR: poor inspiratory effort with possible hazy opacity in the RLL?, pending final results  -Supplemental O2 to maintain O2 level of 92% or higher   -F/u with inflammatory markers  -Started Dexamethasone 6mg for 10 days   -F/u Blcx   -VTE PPx with Lovenox 40mg BID given Ddimer <150   -ID consulted.  pulm eval     Problem/Plan - 3:  ·  Problem: DM (diabetes mellitus).   -Hold home med: Semglee, Metformin and Farixga  -LDISS and fingersticks QHS and AC   -Diet: diabetic.     Problem/Plan - 4:  ·  Problem: HTN (hypertension).   ·  Plan: low normotensive   -C/w home meds: Losartan            Additional Information:  Additional Information: Code: full  Diet: diabetic   VTE ppx: lovenox   Dispo: telemetry      
47 y/o M with PMhx of HTN, DM2, HLD admitted for acute respiratory failure with hypoxia with syncopal work-up, likely due to COVID infection, rule out ACS given comorbidities.     Problem/Plan - 1:  ·  Problem: Syncope.   ·  Plan: New onset of syncope with no clear etiology. Mg 1.4, POCUS ECHO negative for pleural effusion, cannot confirm on structural heart disease. ECG: T-wave inversion, new findings compared to previous ECG of 10/11/22. +covid infection. D-dimer negative. Trops negative x2  ech noted  -Cardiology consultation noted  no further ischemic w/u as inpt  s .     Problem/Plan - 2:  ·  Problem: Acute respiratory failure with hypoxia.   ·  Plan: Covid +ve,  off o2 now  -Supplemental O2 to maintain O2 level of 92% or higher   -F/u with inflammatory markers  off steroids    -ID consulted.  pulm kareen noted  ok for d/c     Problem/Plan - 3:  ·  Problem: DM (diabetes mellitus).   -Hold home med: Semglee, Metformin and Farixga  -LDISS and fingersticks QHS and AC   -Diet: diabetic.     Problem/Plan - 4:  ·  Problem: HTN (hypertension).     -C/w home meds                
47 y/o M with PMH of HTN, DM2, HLD. Presents with syncope. He was diagnosed with COVID last week of September, with no hospitalizations. Found to have mild hypoxia in ED - 88% RA, placed on 2LNC. Hypoxia resolved with incentive spirometry. 
45 y/o M with PMhx of HTN, DM2, HLD admitted for acute respiratory failure with hypoxia with syncopal work-up, likely due to COVID infection, rule out ACS given comorbidities.     Problem/Plan - 1:  ·  Problem: Syncope.   ·  Plan: New onset of syncope with no clear etiology. Mg 1.4, POCUS ECHO negative for pleural effusion, cannot confirm on structural heart disease. ECG: T-wave inversion, new findings compared to previous ECG of 10/11/22. +covid infection. D-dimer negative. Trops negative x2    -Tele monitoring   -Maintain electrolytes, f/u with daily BMP   -Treat underlying cause of acute respiratory failure due to Covid   -Maintain BG of 140-180mg/dl during hospitalization   -Patient can benefit from ECHO TTE, will f/u with Cardiology recs  -Cardiology consulted.     Problem/Plan - 2:  ·  Problem: Acute respiratory failure with hypoxia.   ·  Plan: Covid +ve, requiring supplemental O2. CXR: poor inspiratory effort with possible hazy opacity in the RLL?, pending final results  -Supplemental O2 to maintain O2 level of 92% or higher   -F/u with inflammatory markers  -Started Dexamethasone 6mg for 10 days   -F/u Blcx   -VTE PPx with Lovenox 40mg BID given Ddimer <150   -ID consulted.  pulm eval     Problem/Plan - 3:  ·  Problem: DM (diabetes mellitus).   -Hold home med: Semglee, Metformin and Farixga  -LDISS and fingersticks QHS and AC   -Diet: diabetic.     Problem/Plan - 4:  ·  Problem: HTN (hypertension).   ·  Plan: low normotensive   -C/w home meds: Losartan            Additional Information:  Additional Information: Code: full  Diet: diabetic   VTE ppx: lovenox   Dispo: telemetry      dr debi martinez to cover me 10/26 to 10/31  
47 y/o M with PMH of HTN, DM2, HLD. Presents with syncope. He was diagnosed with COVID last week of September, with no hospitalizations. Found to have mild hypoxia in ED - 88% RA, placed on 2LNC.

## 2022-10-27 NOTE — DISCHARGE NOTE PROVIDER - NSDCMRMEDTOKEN_GEN_ALL_CORE_FT
acetaminophen 325 mg oral tablet: 2 tab(s) orally every 6 hours, As needed, Temp greater or equal to 38C (100.4F), Mild Pain (1 - 3)  albuterol 90 mcg/inh inhalation aerosol: 2 puff(s) inhaled every 6 hours, As needed, Shortness of Breath and/or Wheezing  atorvastatin 40 mg oral tablet: 1 tab(s) orally once a day  benzonatate 100 mg oral capsule: 1 cap(s) orally 3 times a day  escitalopram 5 mg oral tablet: 1 tab(s) orally once a day  Farxiga 10 mg oral tablet: 1 tab(s) orally once a day  losartan-hydroCHLOROthiazide 100 mg-25 mg oral tablet: 1 tab(s) orally once a day  melatonin 3 mg oral tablet: 1 tab(s) orally once a day (at bedtime), As needed, Insomnia  metFORMIN 500 mg oral tablet: 1 tab(s) orally 2 times a day  Semglee 100 units/mL subcutaneous solution: 10 unit(s) subcutaneous once a day   acetaminophen 325 mg oral tablet: 2 tab(s) orally every 6 hours, As needed, Temp greater or equal to 38C (100.4F), Mild Pain (1 - 3)  albuterol 90 mcg/inh inhalation aerosol: 2 puff(s) inhaled every 6 hours, As needed, Shortness of Breath and/or Wheezing  atorvastatin 40 mg oral tablet: 1 tab(s) orally once a day  benzonatate 100 mg oral capsule: 1 cap(s) orally 3 times a day  escitalopram 5 mg oral tablet: 1 tab(s) orally once a day  Farxiga 10 mg oral tablet: 1 tab(s) orally once a day  losartan 100 mg oral tablet: 1 tab(s) orally once a day  melatonin 3 mg oral tablet: 1 tab(s) orally once a day (at bedtime), As needed, Insomnia  metFORMIN 500 mg oral tablet: 1 tab(s) orally 2 times a day  Semglee 100 units/mL subcutaneous solution: 10 unit(s) subcutaneous once a day

## 2022-10-27 NOTE — PROGRESS NOTE ADULT - PROBLEM SELECTOR PLAN 2
+COVID as an outpatient end of September  -No indication for Remdesivir at this time, dx almost 1 month ago.  -Decadron d/c'd, no COVID PNA on CTA chest  -Mild dry cough. Continue tessalon perle 100mg PO TID, change to PRN on discharge.
+COVID as an outpatient end of September  -No indication for Remdesivir at this time, dx almost 1 month ago.  -Decadron d/c'd, no COVID PNA on CTA chest  -Mild dry cough. Start tessalon perle 100mg PO TID.

## 2022-10-27 NOTE — PROGRESS NOTE ADULT - SUBJECTIVE AND OBJECTIVE BOX
DATE OF SERVICE: 10-27-22 @ 12:42  CHIEF COMPLAINT:Patient is a 46y old  Male who presents with a chief complaint of syncope (27 Oct 2022 12:23)    	        PAST MEDICAL & SURGICAL HISTORY:  HTN (hypertension)      HLD (hyperlipidemia)      DM (diabetes mellitus)      No significant past surgical history              REVIEW OF SYSTEMS:  feels much better  RESPIRATORY: No cough, wheezing, chills or hemoptysis; No Shortness of Breath  CARDIOVASCULAR: No chest pain, palpitations, passing out  GASTROINTESTINAL: No abdominal or epigastric pain. No nausea, vomiting, or hematemesis; No diarrhea or constipation. No melena or hematochezia.  GENITOURINARY: No dysuria, frequency, hematuria, or incontinence  NEUROLOGICAL: No headaches, memory loss, loss of strength, numbness, or tremors    MUSCULOSKELETAL: No joint pain or swelling; No muscle, back, or extremity pain    Medications:  MEDICATIONS  (STANDING):  atorvastatin 40 milliGRAM(s) Oral at bedtime  benzonatate 100 milliGRAM(s) Oral three times a day  chlorhexidine 2% Cloths 1 Application(s) Topical <User Schedule>  dextrose 5%. 1000 milliLiter(s) (50 mL/Hr) IV Continuous <Continuous>  dextrose 5%. 1000 milliLiter(s) (100 mL/Hr) IV Continuous <Continuous>  dextrose 50% Injectable 25 Gram(s) IV Push once  dextrose 50% Injectable 12.5 Gram(s) IV Push once  dextrose 50% Injectable 25 Gram(s) IV Push once  enoxaparin Injectable 40 milliGRAM(s) SubCutaneous every 12 hours  escitalopram 5 milliGRAM(s) Oral daily  glucagon  Injectable 1 milliGRAM(s) IntraMuscular once  insulin lispro (ADMELOG) corrective regimen sliding scale   SubCutaneous three times a day before meals  insulin lispro (ADMELOG) corrective regimen sliding scale   SubCutaneous at bedtime  losartan 100 milliGRAM(s) Oral daily    MEDICATIONS  (PRN):  acetaminophen     Tablet .. 650 milliGRAM(s) Oral every 6 hours PRN Temp greater or equal to 38C (100.4F), Mild Pain (1 - 3)  ALBUTerol    90 MICROgram(s) HFA Inhaler 2 Puff(s) Inhalation every 6 hours PRN Shortness of Breath and/or Wheezing  aluminum hydroxide/magnesium hydroxide/simethicone Suspension 30 milliLiter(s) Oral every 4 hours PRN Dyspepsia  dextrose Oral Gel 15 Gram(s) Oral once PRN Blood Glucose LESS THAN 70 milliGRAM(s)/deciliter  melatonin 3 milliGRAM(s) Oral at bedtime PRN Insomnia  ondansetron Injectable 4 milliGRAM(s) IV Push every 8 hours PRN Nausea and/or Vomiting    	    PHYSICAL EXAM:  T(C): 36.8 (10-27-22 @ 11:25), Max: 36.8 (10-27-22 @ 11:25)  HR: 70 (10-27-22 @ 11:25) (69 - 99)  BP: 123/80 (10-27-22 @ 11:25) (114/72 - 137/88)  RR: 18 (10-27-22 @ 11:25) (18 - 20)  SpO2: 98% (10-27-22 @ 11:25) (92% - 99%)  Wt(kg): --  I&O's Summary    26 Oct 2022 07:01  -  27 Oct 2022 07:00  --------------------------------------------------------  IN: 640 mL / OUT: 0 mL / NET: 640 mL        Appearance: Normal	  HEENT:   Normal oral mucosa, PERRL, EOMI	  Lymphatic: No lymphadenopathy  Cardiovascular: Normal S1 S2, No JVD, No murmurs, No edema  Respiratory: dec bs   Gastrointestinal:  Soft, Non-tender, + BS	  Skin: No rashes, No ecchymoses, No cyanosis	  Neurologic: Non-focal  Extremities: Normal range of motion, No clubbing, cyanosis or edema  Vascular: Peripheral pulses palpable 2+ bilaterally    TELEMETRY: 	    ECG:  	  RADIOLOGY:  OTHER: 	  	  LABS:	 	    CARDIAC MARKERS:                                16.4   11.39 )-----------( 300      ( 26 Oct 2022 09:40 )             48.0     10-26    139  |  101  |  22  ----------------------------<  152<H>  3.9   |  21<L>  |  0.77    Ca    10.3      26 Oct 2022 07:16  Mg     1.9     10-26      proBNP:   Lipid Profile:   HgA1c:   TSH:

## 2022-10-27 NOTE — DISCHARGE NOTE NURSING/CASE MANAGEMENT/SOCIAL WORK - NSDCPEFALRISK_GEN_ALL_CORE
For information on Fall & Injury Prevention, visit: [email or web address] OR  [email or web address] OR  https://www.cdc.gov/steadi/patient.html

## 2022-10-27 NOTE — DISCHARGE NOTE PROVIDER - CARE PROVIDER_API CALL
Rusty Tovar  CARDIOVASCULAR DISEASE  [address] [facility], [address] [place] [id number]  Phone: [phone]  Fax: [phone]  Follow Up Time: 1 week

## 2022-10-27 NOTE — DISCHARGE NOTE PROVIDER - NSDCCPCAREPLAN_GEN_ALL_CORE_FT
PRINCIPAL DISCHARGE DIAGNOSIS  Diagnosis: Hypoxia  Assessment and Plan of Treatment: Resolved      SECONDARY DISCHARGE DIAGNOSES  Diagnosis: Syncope  Assessment and Plan of Treatment: HOME CARE INSTRUCTIONS  Have someone stay with you until you feel stable.  Do not drive, operate machinery, or play sports until your caregiver says it is okay.  Keep all follow-up appointments as directed by your caregiver.   Lie down right away if you start feeling like you might faint. Breathe deeply and steadily. Wait until all the symptoms have passed.Drink enough fluids to keep your urine clear or pale yellow.  If you are taking blood pressure or heart medicine, get up slowly, taking several minutes to sit and then stand. This can reduce dizziness.  SEEK IMMEDIATE MEDICAL CARE IF:  You have a severe headache.  You have unusual pain in the chest, abdomen, or back.  You are bleeding from the mouth or rectum, or you have black or tarry stool.  You have an irregular or very fast heartbeat.  You have pain with breathing.  You have repeated fainting or seizure-like jerking during an episode.  You faint when sitting or lying down.  You have confusion.  You have difficulty walking.  You have severe weakness.  You have vision problems.  If you fainted, call your local emergency services. Do not drive yourself to the hospital    Diagnosis: 2019 novel coronavirus disease (COVID-19)  Assessment and Plan of Treatment: Resolved

## 2022-10-27 NOTE — PROGRESS NOTE ADULT - PROBLEM SELECTOR PLAN 1
suspect 2nd to atelectases   -Mild hypoxia in ED  -CTA chest neg PE, no COVID PNA or bacterial PNA   -No c/o SOB or POOLE today   -VBG with no CO2 retention  -F/u TTE with bubble study  -Hypoxia resolved with incentive spirometry   -Now normoxic at rest and with exertion.  -D/c planning per primary team
-Mild hypoxia in ED  -CTA chest neg PE, no COVID PNA or bacterial PNA   -?Mild hypoxia 2nd to atelectasis. Start incentive spirometry.   -Recheck O2 sats on RA at rest and with exertion   -Keep sats >90% with O2 PRN  -No c/o SOB or POOLE today   -VBG with no CO2 retention  -Check TTE with bubble study

## 2025-04-11 NOTE — PROGRESS NOTE ADULT - PROVIDER SPECIALTY LIST ADULT
Internal Medicine
Internal Medicine
Cardiology
Internal Medicine
Pulmonology
Pulmonology
Xray Chest 1 View- PORTABLE-Urgent